# Patient Record
Sex: MALE | Race: BLACK OR AFRICAN AMERICAN | Employment: OTHER | URBAN - METROPOLITAN AREA
[De-identification: names, ages, dates, MRNs, and addresses within clinical notes are randomized per-mention and may not be internally consistent; named-entity substitution may affect disease eponyms.]

---

## 2018-02-12 ENCOUNTER — HOSPITAL ENCOUNTER (EMERGENCY)
Facility: CLINIC | Age: 28
Discharge: HOME | End: 2018-02-12
Attending: EMERGENCY MEDICINE | Admitting: EMERGENCY MEDICINE
Payer: COMMERCIAL

## 2018-02-12 ENCOUNTER — APPOINTMENT (OUTPATIENT)
Dept: ULTRASOUND IMAGING | Facility: CLINIC | Age: 28
End: 2018-02-12
Attending: EMERGENCY MEDICINE
Payer: COMMERCIAL

## 2018-02-12 VITALS
RESPIRATION RATE: 16 BRPM | HEIGHT: 72 IN | OXYGEN SATURATION: 99 % | DIASTOLIC BLOOD PRESSURE: 79 MMHG | WEIGHT: 173.2 LBS | SYSTOLIC BLOOD PRESSURE: 119 MMHG | TEMPERATURE: 98.4 F | BODY MASS INDEX: 23.46 KG/M2

## 2018-02-12 DIAGNOSIS — I82.621 ACUTE DEEP VEIN THROMBOSIS (DVT) OF RIGHT UPPER EXTREMITY, UNSPECIFIED VEIN (H): ICD-10-CM

## 2018-02-12 LAB
ANION GAP SERPL CALCULATED.3IONS-SCNC: 6 MMOL/L (ref 3–14)
BASOPHILS # BLD AUTO: 0 10E9/L (ref 0–0.2)
BASOPHILS NFR BLD AUTO: 0.6 %
BUN SERPL-MCNC: 12 MG/DL (ref 7–30)
CALCIUM SERPL-MCNC: 9.2 MG/DL (ref 8.5–10.1)
CHLORIDE SERPL-SCNC: 106 MMOL/L (ref 94–109)
CK SERPL-CCNC: 247 U/L (ref 30–300)
CO2 SERPL-SCNC: 28 MMOL/L (ref 20–32)
CREAT SERPL-MCNC: 0.86 MG/DL (ref 0.66–1.25)
D DIMER PPP FEU-MCNC: 4 UG/ML FEU (ref 0–0.5)
DIFFERENTIAL METHOD BLD: NORMAL
EOSINOPHIL # BLD AUTO: 0.2 10E9/L (ref 0–0.7)
EOSINOPHIL NFR BLD AUTO: 2.8 %
ERYTHROCYTE [DISTWIDTH] IN BLOOD BY AUTOMATED COUNT: 12.6 % (ref 10–15)
GFR SERPL CREATININE-BSD FRML MDRD: >90 ML/MIN/1.7M2
GLUCOSE SERPL-MCNC: 82 MG/DL (ref 70–99)
HCT VFR BLD AUTO: 48 % (ref 40–53)
HGB BLD-MCNC: 16.5 G/DL (ref 13.3–17.7)
IMM GRANULOCYTES # BLD: 0 10E9/L (ref 0–0.4)
IMM GRANULOCYTES NFR BLD: 0.2 %
LYMPHOCYTES # BLD AUTO: 1.7 10E9/L (ref 0.8–5.3)
LYMPHOCYTES NFR BLD AUTO: 26.7 %
MCH RBC QN AUTO: 29.4 PG (ref 26.5–33)
MCHC RBC AUTO-ENTMCNC: 34.4 G/DL (ref 31.5–36.5)
MCV RBC AUTO: 86 FL (ref 78–100)
MONOCYTES # BLD AUTO: 0.5 10E9/L (ref 0–1.3)
MONOCYTES NFR BLD AUTO: 7.1 %
NEUTROPHILS # BLD AUTO: 4.1 10E9/L (ref 1.6–8.3)
NEUTROPHILS NFR BLD AUTO: 62.6 %
NRBC # BLD AUTO: 0 10*3/UL
NRBC BLD AUTO-RTO: 0 /100
PLATELET # BLD AUTO: 214 10E9/L (ref 150–450)
POTASSIUM SERPL-SCNC: 4 MMOL/L (ref 3.4–5.3)
RBC # BLD AUTO: 5.61 10E12/L (ref 4.4–5.9)
SODIUM SERPL-SCNC: 140 MMOL/L (ref 133–144)
WBC # BLD AUTO: 6.5 10E9/L (ref 4–11)

## 2018-02-12 PROCEDURE — 99284 EMERGENCY DEPT VISIT MOD MDM: CPT | Mod: 25 | Performed by: EMERGENCY MEDICINE

## 2018-02-12 PROCEDURE — 85025 COMPLETE CBC W/AUTO DIFF WBC: CPT | Performed by: EMERGENCY MEDICINE

## 2018-02-12 PROCEDURE — 85379 FIBRIN DEGRADATION QUANT: CPT | Performed by: EMERGENCY MEDICINE

## 2018-02-12 PROCEDURE — 80048 BASIC METABOLIC PNL TOTAL CA: CPT | Performed by: EMERGENCY MEDICINE

## 2018-02-12 PROCEDURE — 82550 ASSAY OF CK (CPK): CPT | Performed by: EMERGENCY MEDICINE

## 2018-02-12 PROCEDURE — 99284 EMERGENCY DEPT VISIT MOD MDM: CPT | Mod: Z6 | Performed by: EMERGENCY MEDICINE

## 2018-02-12 PROCEDURE — 93971 EXTREMITY STUDY: CPT | Mod: RT

## 2018-02-12 ASSESSMENT — ENCOUNTER SYMPTOMS
FEVER: 0
COUGH: 0
ARTHRALGIAS: 0
SHORTNESS OF BREATH: 0
WOUND: 0
CHILLS: 0
APPETITE CHANGE: 0
PALPITATIONS: 0

## 2018-02-12 NOTE — ED AVS SNAPSHOT
[facility], [place], Emergency Department    [address]    [place] [address]    Phone:  [phone]    Fax:  [phone]                                       Adriana Singh   MRN: [id number]    Department:  [facility], Emergency Department   Date of Visit:  2/12/2018           After Visit Summary Signature Page     I have received my discharge instructions, and my questions have been answered. I have discussed any challenges I see with this plan with the nurse or doctor.    ..........................................................................................................................................  Patient/Patient Representative Signature      ..........................................................................................................................................  Patient Representative Print Name and Relationship to Patient    ..................................................               ................................................  Date                                            Time    ..........................................................................................................................................  Reviewed by Signature/Title    ...................................................              ..............................................  Date                                                            Time

## 2018-02-12 NOTE — ED PROVIDER NOTES
History     Chief Complaint   Patient presents with     Arm Pain     Right arm pain and swelling started Thursday night. No injury. Has difficulty holding bottle of water. Tingling in right UE.      HPI  20-year-old male without significant past medical history having today to the emergency room for evaluation of right upper extremity swelling.  The patient reports onset of approximately 4-5 nights ago.  He initially noted some tingling when sleeping with his arm above his head.  He states over the past several days he has noted increasing generalized swelling about the right upper extremity.  He reports no trauma.  He has had no similar symptoms in the past.  At this time he reports no wound or injury.  He denies distal change in sensation or motor ability.  He denies other swelling of alternative limbs.  He reports no systemic symptoms such as chest pain, shortness of breath, fever, chills.  He reports no history of coagulopathy or prior intravenous access to the extra extremity.  He states he is healthy at baseline and reports no ongoing issues.        I have reviewed the Medications, Allergies, Past Medical and Surgical History, and Social History in the Epic system.    Review of Systems   Constitutional: Negative for appetite change, chills and fever.   Respiratory: Negative for cough and shortness of breath.    Cardiovascular: Negative for chest pain, palpitations and leg swelling.   Musculoskeletal: Negative for arthralgias.   Skin: Negative for rash and wound.       Physical Exam   BP: 119/79  Heart Rate: 83  Temp: 98.4  F (36.9  C)  Resp: 16  Height: 182.9 cm (6')  Weight: 78.6 kg (173 lb 3.2 oz)  SpO2: 99 %      Physical Exam   Constitutional: He is oriented to person, place, and time. He appears well-developed and well-nourished. No distress.   Cardiovascular: Normal rate, regular rhythm and normal heart sounds.    Pulmonary/Chest: Effort normal. No respiratory distress. He has no wheezes.   Abdominal:  Soft. He exhibits no distension. There is no tenderness. There is no rebound.   Musculoskeletal: He exhibits no deformity.        Arms:  Neurological: He is alert and oriented to person, place, and time. No cranial nerve deficit.   Skin: Skin is warm. No rash noted. He is not diaphoretic.   Psychiatric: He has a normal mood and affect.       ED Course     ED Course     Procedures       Labs Ordered and Resulted from Time of ED Arrival Up to the Time of Departure from the ED - No data to display         Assessments & Plan (with Medical Decision Making)     20-year-old male without segment past medical history arriving today to the emergency room for evaluation of right upper extremity swelling.  Upon arrival this patient is known to be alert, afebrile, hemodynamically stable.  He appears well at rest.  He is speaking full sentences without evidence of increased work of breathing or cardiovascular compromise.  With lack of symptoms with suspicion at this time for pulmonary embolus would be quite low.  He does have generalized edematous appearance to the right upper extremity with distended superficial veins.  Distal radial pulse and capillary refill within normal limits with suspicion at this time for arterial involvement such as aneurysm or arterial thrombus would be very low.  He has no neurologic deficit.  He has no evidence of external trauma there is no evidence of dislocation or joint involvement such as septic arthritis or effusion.  While his symptoms and risk factors are very low this is most consistent with deep venous thrombosis of the right upper extremity.  Plan at this time will be to obtain laboratory studies to include CBC basic metabolic panel d-dimer and right upper extremity ultrasonography.    Laboratory studies do demonstrate an elevated d-dimer and ultrasonography reveals an occlusive clot.  At this time the patient seems to have no obvious reason for the clot in the seems to be unprovoked in the  right upper extremity which is odd.  Because of this I did place an outpatient follow-up with vascular medicine for evaluation and consideration of coagulopathy workup.  The patient was placed on Xarelto at a risk and benefit discussion on Xarelto versus warfarin and Lovenox therapy.  We discussed indications to call or return emergently such as chest pain, shortness of breath, traumatic injury or other concern.    I have reviewed the nursing notes.    I have reviewed the findings, diagnosis, plan and need for follow up with the patient.    New Prescriptions    No medications on file       Final diagnoses:   Acute deep vein thrombosis (DVT) of right upper extremity, unspecified vein (H)       2/12/2018   [facility], [place], EMERGENCY DEPARTMENT     Zaid Nassar MD  02/12/18 [id number]

## 2018-02-12 NOTE — ED NOTES
Right arm pain and swelling started Thursday night. No injury. Has difficulty holding bottle of water. Denies pain, just Tingling in right UE

## 2018-02-12 NOTE — ED AVS SNAPSHOT
[facility], Emergency Department    [address]    [place] [address]    Phone:  [phone]    Fax:  [phone]                                       Adriana Singh   MRN: [id number]    Department:  [facility], Emergency Department   Date of Visit:  2/12/2018           Patient Information     Date Of Birth          1990        Your diagnoses for this visit were:     Acute deep vein thrombosis (DVT) of right upper extremity, unspecified vein (H)        You were seen by Zaid Nassar MD.      Follow-up Information     Follow up with [facility], Emergency Department.    Specialty:  EMERGENCY MEDICINE    Why:  As needed, If symptoms worsen    Contact information:    [address]  [facility] [place] [address] [phone]    Additional information:    The [facility] is located in the [facility]. lt is easily accessible from virtually any point in the [facility] area, via [address]        Discharge Instructions         Please make an appointment to follow up with [facility] (phone: [phone]) as soon as possible.      Complications of Deep Vein Thrombosis  Deep vein thrombosis (DVT) is a condition involving the formation of a blood clot or thrombus in a deep vein. One may develop in a large vein deep inside the leg, arm, or other part of the body. Complications from deep vein thrombosis can be very serious. They can include pulmonary embolism (PE), chronic venous insufficiency, and post-thrombotic syndrome.   You may hear healthcare providers use the term venous thromboembolism (VTE) to describe DVT and PE. They use the term VTE because the two conditions are very closely related. And, because their prevention and treatment are closely related.         A pulmonary embolism can block a blood vessel in the lungs and must be treated right away.      Pulmonary embolism  Pulmonary embolism (PE) happens when part of the clot, called  an embolus, separates from the vein. It travels to the lungs and cuts off the flow of blood. A PE may develop quickly. It is a medical emergency and may cause death.      When to seek medical advice  Call 911 or emergency help if you have symptoms of a blood clot in the lungs. Symptoms may include:    Chest pain    Trouble breathing or sudden shortness of breath    Coughing (may cough up blood)    Fainting    Fast heartbeat    Sweating  You may have bleeding if you take medicine to help prevent blood clots. Call 911 if you have heavy or uncontrolled bleeding.  When to call your healthcare provider  Call your healthcare provider if you have symptoms of a blood clot. The symptoms include:    Swelling    Pain    Redness in your leg, arm, or other area  Call your healthcare provider if you have signs or symptoms of bleeding, like blood in the urine, bleeding with bowel movements, or bleeding from the nose, gums, a cut, or vagina.   Chronic venous insufficiency and post-thrombotic syndrome  Two other complications of deep vein thrombosis are chronic venous insufficiency and post-thrombotic syndrome.  Chronic venous insufficiency may happen following deep vein thrombosis of a leg vein. It means that a vein no longer works as well. It is a long-term condition where blood stays in the vein instead of flowing back to the heart. Pain and swelling in the leg are common symptoms.  Post-thrombotic syndrome may also happen following deep vein thrombosis of a leg vein. It is a long-term problem with pain, swelling, and redness. Ulcers and sores can also happen if the condition is not treated early. These complications and associated symptoms may make it difficult to walk and take part in daily activities.  Date Last Reviewed: 5/1/2016 2000-2017 [employer]. [address] [place] [address], [place] [address]. All rights reserved. This information is not intended as a substitute for professional medical care. Always follow  your healthcare professional's instructions.          Understanding Deep Vein Thrombosis  Deep vein thrombosis (DVT) is a condition in which a blood clot or thrombus forms in a deep vein. A blood clot is most common in the leg, but can develop in a large vein deep inside the leg, arm, or other part of the body. Part of the clot called an embolus can separate from the vein. It may travel to the lungs and form a pulmonary embolus (PE). This can cut off the flow to a portion of the lung or to the entire lung. A PE is a medical emergency and may cause death. Healthcare providers use the term venous thromboembolism (VTE) to describe the two conditions, DVT and PE. They use the term VTE because the two conditions are very closely related. And, because their prevention and treatment are closely related.  Over time, a blood clot can also permanently damage veins. To protect your health, a blood clot must be treated right away.  How DVT develops  The deep veins of the legs are located in the muscles. These help carry blood clot from the legs to the heart. When leg muscles contract and relax, blood is squeezed through the veins back to the heart. One-way valves inside the veins help keep the blood moving in the right direction. When blood moves too slowly or not at all, it can pool in the veins. This makes a clot more likely to form.    Risk factors  Anyone can develop a blood clot. The following risk factors make a blood clot more likely to happen:    Being inactive for a long period, such as when you re in the hospital, or traveling by plane or car    Injury to a vein from an accident, a broken bone, or surgery    Having blood clots in the past    Personal or family history of a blood-clotting disorder    Recent surgery    Cancer and certain cancer treatments    Smoking  Other factors can also put you at higher risk for a blood clot. They include:    Age over 60 years    Pregnancy    Taking birth control or hormone  replacement    Having other vein problems    Being overweight  Common symptoms  A blood clot does not always cause obvious symptoms. If you do have symptoms, they usually happen suddenly. They may include:    Pain, especially deep in the muscle    Swelling    Aching or tenderness    Red or warm skin    Fever  Call your healthcare provider if you have these symptoms.  Symptoms of pulmonary embolism may include:    Trouble breathing    Fast heartbeat    Chest pain    Sweating    Coughing (may cough up blood)    Fainting  Call 911 if you have these symptoms.   If you take medicine to help prevent blood clots, you have an increased risk of bleeding. Call 911 if you have heavy or uncontrolled bleeding. Call your healthcare provider if you have other signs or symptoms of bleeding like blood in the urine, bleeding with bowel movements, or bleeding from the nose, gums, a cut, or vagina.  Diagnosing DVT  Diagnosis begins with thorough questions about your symptoms and medical history along with a physical exam.  Diagnostic tests include:    An imaging test called a duplex ultrasound. This test uses sound waves to create pictures of veins and blood flow.    Blood tests to check for clotting and other problems.  If your healthcare provider thinks you may have pulmonary embolism, additional testing will be done.  Treating DVT  Treating a blood clot may include:    Medicine to thin the blood and prevent pulmonary embolism and other complications.    Staying in the hospital. This may or may not be necessary.    Surgery for some people, like those who cannot take blood-thinning medicines.  Preventing DVT  Many people who are in the hospital are at an increased risk of developing blood clots. So, preventing blood clots is an important part of in-hospital care. The care may include getting out of bed regularly, taking medicine, or using special therapies or devices. Other factors and conditions may increase the risk of blood clots.  "Review your risk with your healthcare provider.  Date Last Reviewed: 5/1/2016 2000-2017 [employer]. [address] [place] [address], [place] [address]. All rights reserved. This information is not intended as a substitute for professional medical care. Always follow your healthcare professional's instructions.          24 Hour Appointment Hotline       To make an appointment at any [facility], call [phone] ([phone]). If you don't have a family doctor or clinic, we will help you find one. [facility] are conveniently located to serve the needs of you and your family.          ED Discharge Orders     VASCULAR MEDICINE REFERRAL ([facility])       PAD without symptoms - order lipid panel and \"US CLAUDIA Doppler no exercise\" (ABH2284)  PAD with claudication - order \"US CLAUDIA Doppler with exercise\" ([id number]), \"US aorta/ivc/iliac duplex complete\" ([id number]) and \"US lower extremity arterial duplex bilateral\" ([id number])  PAD with critical limb ischemia - resting CLAUDIA and toe-brachial index (refer to vascular medicine for triage of testing)  Varicose veins/edema - \"US lower extremity venous duplex bilateral\" ([id number])  [Number to call to schedule: [phone]. Vascular medicine providers are: Bennett Leslie Godishala]                       Review of your medicines      START taking        Dose / Directions Last dose taken    Rivaroxaban 15 & 20 MG Tbpk   Commonly known as:  XARELTO STARTER PACK   Dose:  1 Package   Quantity:  1 each        Take 1 Package by mouth once for 1 dose   Refills:  0                Prescriptions were sent or printed at these locations (1 Prescription)                   Other Prescriptions                Printed at Department/Unit printer (1 of 1)         Rivaroxaban (XARELTO STARTER PACK) 15 & 20 MG TBPK                Procedures and tests performed during your visit     Arm, right, venous US    Basic metabolic panel    CBC with platelets differential    CK total    D dimer " "quantitative      Orders Needing Specimen Collection     None      Pending Results     Date and Time Order Name Status Description    2018 1607 Arm, right, venous US Preliminary             Pending Culture Results     No orders found from 2/10/2018 to 2018.            Pending Results Instructions     If you had any lab results that were not finalized at the time of your Discharge, you can call the ED Lab Result RN at [phone]. You will be contacted by this team for any positive Lab results or changes in treatment. The nurses are available 7 days a week from 10A to 6:30P.  You can leave a message 24 hours per day and they will return your call.        Thank you for choosing [facility]       Thank you for choosing [facility] for your care. Our goal is always to provide you with excellent care. Hearing back from our patients is one way we can continue to improve our services. Please take a few minutes to complete the written survey that you may receive in the mail after you visit with us. Thank you!        [employer] Information     [employer] lets you send messages to your doctor, view your test results, renew your prescriptions, schedule appointments and more. To sign up, go to [email or web address] . Click on \"Log in\" on the left side of the screen, which will take you to the Welcome page. Then click on \"Sign up Now\" on the right side of the page.     You will be asked to enter the access code listed below, as well as some personal information. Please follow the directions to create your username and password.     Your access code is: [email or web address]  Expires: 2018  6:20 PM     Your access code will  in 90 days. If you need help or a new code, please call your [facility] clinic or [phone].        Care EveryWhere ID     This is your Care EveryWhere ID. This could be used by other organizations to access your [facility] medical records  [id number]        Equal Access to Services     GRACE JACKSON AH: " Hadii flower Barclay, wawillyda lorrie, qaboogieta kaaljasmeet ngo. So [facility] [phone].    ATENCIÓN: Si habla español, tiene a fisher disposición servicios gratuitos de asistencia lingüística. Llame al [phone].    We comply with applicable federal civil rights laws and [place] laws. We do not discriminate on the basis of race, color, national origin, age, disability, sex, sexual orientation, or gender identity.            After Visit Summary       This is your record. Keep this with you and show to your community pharmacist(s) and doctor(s) at your next visit.

## 2018-02-13 NOTE — DISCHARGE INSTRUCTIONS
Please make an appointment to follow up with [facility] (phone: [phone]) as soon as possible.      Complications of Deep Vein Thrombosis  Deep vein thrombosis (DVT) is a condition involving the formation of a blood clot or thrombus in a deep vein. One may develop in a large vein deep inside the leg, arm, or other part of the body. Complications from deep vein thrombosis can be very serious. They can include pulmonary embolism (PE), chronic venous insufficiency, and post-thrombotic syndrome.   You may hear healthcare providers use the term venous thromboembolism (VTE) to describe DVT and PE. They use the term VTE because the two conditions are very closely related. And, because their prevention and treatment are closely related.         A pulmonary embolism can block a blood vessel in the lungs and must be treated right away.      Pulmonary embolism  Pulmonary embolism (PE) happens when part of the clot, called an embolus, separates from the vein. It travels to the lungs and cuts off the flow of blood. A PE may develop quickly. It is a medical emergency and may cause death.      When to seek medical advice  Call 911 or emergency help if you have symptoms of a blood clot in the lungs. Symptoms may include:    Chest pain    Trouble breathing or sudden shortness of breath    Coughing (may cough up blood)    Fainting    Fast heartbeat    Sweating  You may have bleeding if you take medicine to help prevent blood clots. Call 911 if you have heavy or uncontrolled bleeding.  When to call your healthcare provider  Call your healthcare provider if you have symptoms of a blood clot. The symptoms include:    Swelling    Pain    Redness in your leg, arm, or other area  Call your healthcare provider if you have signs or symptoms of bleeding, like blood in the urine, bleeding with bowel movements, or bleeding from the nose, gums, a cut, or vagina.   Chronic venous insufficiency and post-thrombotic  syndrome  Two other complications of deep vein thrombosis are chronic venous insufficiency and post-thrombotic syndrome.  Chronic venous insufficiency may happen following deep vein thrombosis of a leg vein. It means that a vein no longer works as well. It is a long-term condition where blood stays in the vein instead of flowing back to the heart. Pain and swelling in the leg are common symptoms.  Post-thrombotic syndrome may also happen following deep vein thrombosis of a leg vein. It is a long-term problem with pain, swelling, and redness. Ulcers and sores can also happen if the condition is not treated early. These complications and associated symptoms may make it difficult to walk and take part in daily activities.  Date Last Reviewed: 5/1/2016 2000-2017 [employer]. [address] [place] [address], [place] [address]. All rights reserved. This information is not intended as a substitute for professional medical care. Always follow your healthcare professional's instructions.          Understanding Deep Vein Thrombosis  Deep vein thrombosis (DVT) is a condition in which a blood clot or thrombus forms in a deep vein. A blood clot is most common in the leg, but can develop in a large vein deep inside the leg, arm, or other part of the body. Part of the clot called an embolus can separate from the vein. It may travel to the lungs and form a pulmonary embolus (PE). This can cut off the flow to a portion of the lung or to the entire lung. A PE is a medical emergency and may cause death. Healthcare providers use the term venous thromboembolism (VTE) to describe the two conditions, DVT and PE. They use the term VTE because the two conditions are very closely related. And, because their prevention and treatment are closely related.  Over time, a blood clot can also permanently damage veins. To protect your health, a blood clot must be treated right away.  How DVT develops  The deep veins of the legs are located  in the muscles. These help carry blood clot from the legs to the heart. When leg muscles contract and relax, blood is squeezed through the veins back to the heart. One-way valves inside the veins help keep the blood moving in the right direction. When blood moves too slowly or not at all, it can pool in the veins. This makes a clot more likely to form.    Risk factors  Anyone can develop a blood clot. The following risk factors make a blood clot more likely to happen:    Being inactive for a long period, such as when you re in the hospital, or traveling by plane or car    Injury to a vein from an accident, a broken bone, or surgery    Having blood clots in the past    Personal or family history of a blood-clotting disorder    Recent surgery    Cancer and certain cancer treatments    Smoking  Other factors can also put you at higher risk for a blood clot. They include:    Age over 60 years    Pregnancy    Taking birth control or hormone replacement    Having other vein problems    Being overweight  Common symptoms  A blood clot does not always cause obvious symptoms. If you do have symptoms, they usually happen suddenly. They may include:    Pain, especially deep in the muscle    Swelling    Aching or tenderness    Red or warm skin    Fever  Call your healthcare provider if you have these symptoms.  Symptoms of pulmonary embolism may include:    Trouble breathing    Fast heartbeat    Chest pain    Sweating    Coughing (may cough up blood)    Fainting  Call 911 if you have these symptoms.   If you take medicine to help prevent blood clots, you have an increased risk of bleeding. Call 911 if you have heavy or uncontrolled bleeding. Call your healthcare provider if you have other signs or symptoms of bleeding like blood in the urine, bleeding with bowel movements, or bleeding from the nose, gums, a cut, or vagina.  Diagnosing DVT  Diagnosis begins with thorough questions about your symptoms and medical history along  with a physical exam.  Diagnostic tests include:    An imaging test called a duplex ultrasound. This test uses sound waves to create pictures of veins and blood flow.    Blood tests to check for clotting and other problems.  If your healthcare provider thinks you may have pulmonary embolism, additional testing will be done.  Treating DVT  Treating a blood clot may include:    Medicine to thin the blood and prevent pulmonary embolism and other complications.    Staying in the hospital. This may or may not be necessary.    Surgery for some people, like those who cannot take blood-thinning medicines.  Preventing DVT  Many people who are in the hospital are at an increased risk of developing blood clots. So, preventing blood clots is an important part of in-hospital care. The care may include getting out of bed regularly, taking medicine, or using special therapies or devices. Other factors and conditions may increase the risk of blood clots. Review your risk with your healthcare provider.  Date Last Reviewed: 5/1/2016 2000-2017 [employer]. [address] [place] [address], [place] [address]. All rights reserved. This information is not intended as a substitute for professional medical care. Always follow your healthcare professional's instructions.

## 2018-04-26 ENCOUNTER — APPOINTMENT (OUTPATIENT)
Dept: CT IMAGING | Facility: CLINIC | Age: 28
End: 2018-04-26
Attending: INTERNAL MEDICINE
Payer: COMMERCIAL

## 2018-04-26 ENCOUNTER — HOSPITAL ENCOUNTER (EMERGENCY)
Facility: CLINIC | Age: 28
Discharge: HOME | End: 2018-04-26
Attending: INTERNAL MEDICINE | Admitting: INTERNAL MEDICINE
Payer: COMMERCIAL

## 2018-04-26 VITALS
OXYGEN SATURATION: 100 % | RESPIRATION RATE: 18 BRPM | SYSTOLIC BLOOD PRESSURE: 108 MMHG | TEMPERATURE: 98.3 F | WEIGHT: 174.13 LBS | HEART RATE: 67 BPM | DIASTOLIC BLOOD PRESSURE: 76 MMHG | BODY MASS INDEX: 23.62 KG/M2

## 2018-04-26 DIAGNOSIS — Z91.148 NONCOMPLIANCE WITH MEDICATION REGIMEN: ICD-10-CM

## 2018-04-26 DIAGNOSIS — I82.621 ACUTE DEEP VEIN THROMBOSIS (DVT) OF RIGHT UPPER EXTREMITY, UNSPECIFIED VEIN (H): ICD-10-CM

## 2018-04-26 LAB
ALBUMIN SERPL-MCNC: 3.9 G/DL (ref 3.4–5)
ALBUMIN UR-MCNC: NEGATIVE MG/DL
ALP SERPL-CCNC: 67 U/L (ref 40–150)
ALT SERPL W P-5'-P-CCNC: 12 U/L (ref 0–70)
ANION GAP SERPL CALCULATED.3IONS-SCNC: 9 MMOL/L (ref 3–14)
APPEARANCE UR: CLEAR
AST SERPL W P-5'-P-CCNC: 28 U/L (ref 0–45)
BASOPHILS # BLD AUTO: 0 10E9/L (ref 0–0.2)
BASOPHILS NFR BLD AUTO: 0.3 %
BILIRUB SERPL-MCNC: 1.2 MG/DL (ref 0.2–1.3)
BILIRUB UR QL STRIP: NEGATIVE
BUN SERPL-MCNC: 9 MG/DL (ref 7–30)
CALCIUM SERPL-MCNC: 9.2 MG/DL (ref 8.5–10.1)
CHLORIDE SERPL-SCNC: 105 MMOL/L (ref 94–109)
CO2 SERPL-SCNC: 28 MMOL/L (ref 20–32)
COLOR UR AUTO: YELLOW
CREAT SERPL-MCNC: 1.05 MG/DL (ref 0.66–1.25)
D DIMER PPP FEU-MCNC: <0.3 UG/ML FEU (ref 0–0.5)
DIFFERENTIAL METHOD BLD: NORMAL
EOSINOPHIL # BLD AUTO: 0.1 10E9/L (ref 0–0.7)
EOSINOPHIL NFR BLD AUTO: 2.4 %
ERYTHROCYTE [DISTWIDTH] IN BLOOD BY AUTOMATED COUNT: 12.4 % (ref 10–15)
GFR SERPL CREATININE-BSD FRML MDRD: 84 ML/MIN/1.7M2
GLUCOSE SERPL-MCNC: 109 MG/DL (ref 70–99)
GLUCOSE UR STRIP-MCNC: NEGATIVE MG/DL
HCT VFR BLD AUTO: 43.9 % (ref 40–53)
HGB BLD-MCNC: 15.3 G/DL (ref 13.3–17.7)
HGB UR QL STRIP: NEGATIVE
IMM GRANULOCYTES # BLD: 0 10E9/L (ref 0–0.4)
IMM GRANULOCYTES NFR BLD: 0.2 %
INR PPP: 1.11 (ref 0.86–1.14)
KETONES UR STRIP-MCNC: NEGATIVE MG/DL
LEUKOCYTE ESTERASE UR QL STRIP: NEGATIVE
LYMPHOCYTES # BLD AUTO: 1.7 10E9/L (ref 0.8–5.3)
LYMPHOCYTES NFR BLD AUTO: 28.4 %
MCH RBC QN AUTO: 29 PG (ref 26.5–33)
MCHC RBC AUTO-ENTMCNC: 34.9 G/DL (ref 31.5–36.5)
MCV RBC AUTO: 83 FL (ref 78–100)
MONOCYTES # BLD AUTO: 0.3 10E9/L (ref 0–1.3)
MONOCYTES NFR BLD AUTO: 5 %
MUCOUS THREADS #/AREA URNS LPF: PRESENT /LPF
NEUTROPHILS # BLD AUTO: 3.7 10E9/L (ref 1.6–8.3)
NEUTROPHILS NFR BLD AUTO: 63.7 %
NITRATE UR QL: NEGATIVE
NRBC # BLD AUTO: 0 10*3/UL
NRBC BLD AUTO-RTO: 0 /100
NT-PROBNP SERPL-MCNC: 23 PG/ML (ref 0–450)
PH UR STRIP: 6 PH (ref 5–7)
PLATELET # BLD AUTO: 224 10E9/L (ref 150–450)
POTASSIUM SERPL-SCNC: 3.7 MMOL/L (ref 3.4–5.3)
PROT SERPL-MCNC: 7.3 G/DL (ref 6.8–8.8)
RBC # BLD AUTO: 5.28 10E12/L (ref 4.4–5.9)
RBC #/AREA URNS AUTO: 0 /HPF (ref 0–2)
SODIUM SERPL-SCNC: 142 MMOL/L (ref 133–144)
SOURCE: ABNORMAL
SP GR UR STRIP: 1.01 (ref 1–1.03)
TROPONIN I SERPL-MCNC: <0.015 UG/L (ref 0–0.04)
UROBILINOGEN UR STRIP-MCNC: NORMAL MG/DL (ref 0–2)
WBC # BLD AUTO: 5.8 10E9/L (ref 4–11)
WBC #/AREA URNS AUTO: <1 /HPF (ref 0–5)

## 2018-04-26 PROCEDURE — 80053 COMPREHEN METABOLIC PANEL: CPT | Performed by: INTERNAL MEDICINE

## 2018-04-26 PROCEDURE — 25000128 H RX IP 250 OP 636: Performed by: INTERNAL MEDICINE

## 2018-04-26 PROCEDURE — 85379 FIBRIN DEGRADATION QUANT: CPT | Performed by: INTERNAL MEDICINE

## 2018-04-26 PROCEDURE — 93010 ELECTROCARDIOGRAM REPORT: CPT | Mod: Z6 | Performed by: INTERNAL MEDICINE

## 2018-04-26 PROCEDURE — 85025 COMPLETE CBC W/AUTO DIFF WBC: CPT | Performed by: INTERNAL MEDICINE

## 2018-04-26 PROCEDURE — 96360 HYDRATION IV INFUSION INIT: CPT | Mod: 59 | Performed by: INTERNAL MEDICINE

## 2018-04-26 PROCEDURE — 25000125 ZZHC RX 250: Performed by: INTERNAL MEDICINE

## 2018-04-26 PROCEDURE — 85610 PROTHROMBIN TIME: CPT | Performed by: INTERNAL MEDICINE

## 2018-04-26 PROCEDURE — 99284 EMERGENCY DEPT VISIT MOD MDM: CPT | Mod: 25 | Performed by: INTERNAL MEDICINE

## 2018-04-26 PROCEDURE — 81001 URINALYSIS AUTO W/SCOPE: CPT | Performed by: INTERNAL MEDICINE

## 2018-04-26 PROCEDURE — 83880 ASSAY OF NATRIURETIC PEPTIDE: CPT | Performed by: INTERNAL MEDICINE

## 2018-04-26 PROCEDURE — 99285 EMERGENCY DEPT VISIT HI MDM: CPT | Mod: 25 | Performed by: INTERNAL MEDICINE

## 2018-04-26 PROCEDURE — 71260 CT THORAX DX C+: CPT

## 2018-04-26 PROCEDURE — 84484 ASSAY OF TROPONIN QUANT: CPT | Performed by: INTERNAL MEDICINE

## 2018-04-26 PROCEDURE — 93005 ELECTROCARDIOGRAM TRACING: CPT | Performed by: INTERNAL MEDICINE

## 2018-04-26 RX ORDER — IOPAMIDOL 755 MG/ML
100 INJECTION, SOLUTION INTRAVASCULAR ONCE
Status: COMPLETED | OUTPATIENT
Start: 2018-04-26 | End: 2018-04-26

## 2018-04-26 RX ADMIN — SODIUM CHLORIDE 1000 ML: 9 INJECTION, SOLUTION INTRAVENOUS at 13:35

## 2018-04-26 RX ADMIN — IOPAMIDOL 64 ML: 755 INJECTION, SOLUTION INTRAVENOUS at 14:14

## 2018-04-26 RX ADMIN — SODIUM CHLORIDE 84 ML: 9 INJECTION, SOLUTION INTRAVENOUS at 14:14

## 2018-04-26 ASSESSMENT — ENCOUNTER SYMPTOMS
WHEEZING: 0
SHORTNESS OF BREATH: 0

## 2018-04-26 NOTE — ED PROVIDER NOTES
[facility] EMERGENCY DEPARTMENT ([facility])    4/26/18       History     Chief Complaint   Patient presents with     Chest Pain     Right arm is swollen and radiates into right chest.  Pt states he has had a clot in that arm before.  States he feels SOA when going up steps.  Took xerato last time he had this.     HPI  Said Francisco is a 28 year old male with a medical history significant for acute DVT of the axillary vein of right upper extremity who presents to the Emergency Department for evaluation of right upper extremity swelling.  Per review of patient's chart, patient was originally seen and evaluated on 2/12/2018 complaining of right upper extremity swelling.  Patient was found to have a right upper extremity DVT and started on Xarelto.  The patient here states that he has been taking his Xarelto as prescribed and he did initially notice improvement of swelling of the right upper extremity.  The patient returns today as the swelling has began worsening once again.  The patient notes that the swelling involves his entire right upper extremity and his anterior right shoulder.  He notes pain in the same areas as well.  He denies any difficulty breathing, denies any chest pain, denies any leg swelling or leg pain.    I have reviewed the Medications, Allergies, Past Medical and Surgical History, and Social History in the [employer] system.    Past Medical History:   Diagnosis Date     Acute deep vein thrombosis (DVT) of axillary vein of right upper extremity (H)        History reviewed. No pertinent surgical history.    No family history on file.    Social History   Substance Use Topics     Smoking status: Never Smoker     Smokeless tobacco: Never Used     Alcohol use No       No current facility-administered medications for this encounter.      Current Outpatient Prescriptions   Medication     rivaroxaban ANTICOAGULANT (XARELTO) 20 MG TABS tablet     [DISCONTINUED] Rivaroxaban (XARELTO STARTER PACK) 15 & 20 MG  TBPK      No Known Allergies      Review of Systems   Respiratory: Negative for shortness of breath and wheezing.    Cardiovascular: Negative for chest pain and leg swelling.   Musculoskeletal:        Positive for right upper extremity swelling  Positive for right upper extremity pain  Negative for lower extremity pain   All other systems reviewed and are negative.      Physical Exam   BP: 125/78  Pulse: 67  Heart Rate: 68  Temp: 98.3  F (36.8  C)  Resp: 16  Weight: 79 kg (174 lb 2 oz)  SpO2: 100 %      Physical Exam   Constitutional: He is oriented to person, place, and time. No distress.   HENT:   Head: Atraumatic.   Mouth/Throat: Oropharynx is clear and moist. No oropharyngeal exudate.   Eyes: Pupils are equal, round, and reactive to light. No scleral icterus.   Neck: Neck supple. No JVD present.   Cardiovascular: Normal rate, normal heart sounds and intact distal pulses.  Exam reveals no gallop and no friction rub.    No murmur heard.  Pulmonary/Chest: Effort normal and breath sounds normal. No respiratory distress. He has no wheezes. He has no rales. He exhibits no tenderness.   Abdominal: Soft. Bowel sounds are normal. He exhibits no distension and no mass. There is no tenderness. There is no rebound and no guarding.   Musculoskeletal: He exhibits no edema or tenderness.        Right shoulder: He exhibits swelling. He exhibits normal range of motion, no tenderness, no bony tenderness, no effusion, no crepitus, no deformity, no laceration, no pain, no spasm, normal pulse and normal strength.        Arms:  Neurological: He is alert and oriented to person, place, and time. No cranial nerve deficit. Coordination normal.   Skin: Skin is warm. No rash noted. He is not diaphoretic.       ED Course   12:26 PM  The patient was seen and examined by Khalida Pelayo MD in Room ED01.     ED Course     Procedures                EKG Interpretation:      Interpreted by Khalida Pelayo MD  Time reviewed:12:56   Symptoms at time  of EKG: None   Rhythm: Normal sinus   Rate: 70 bpm  Axis: Normal  Ectopy: None  Conduction: Normal  ST Segments/ T Waves: No ST-T wave changes and No acute ischemic changes  Q Waves: None  Comparison to prior: No old EKG available    Clinical Impression: normal EKG    Results for orders placed or performed during the hospital encounter of 04/26/18 (from the past 24 hour(s))   CBC with platelets differential     Status: None    Collection Time: 04/26/18 12:50 PM   Result Value Ref Range    WBC 5.8 4.0 - 11.0 10e9/L    RBC Count 5.28 4.4 - 5.9 10e12/L    Hemoglobin 15.3 13.3 - 17.7 g/dL    Hematocrit 43.9 40.0 - 53.0 %    MCV 83 78 - 100 fl    MCH 29.0 26.5 - 33.0 pg    MCHC 34.9 31.5 - 36.5 g/dL    RDW 12.4 10.0 - 15.0 %    Platelet Count 224 150 - 450 10e9/L    Diff Method Automated Method     % Neutrophils 63.7 %    % Lymphocytes 28.4 %    % Monocytes 5.0 %    % Eosinophils 2.4 %    % Basophils 0.3 %    % Immature Granulocytes 0.2 %    Nucleated RBCs 0 0 /100    Absolute Neutrophil 3.7 1.6 - 8.3 10e9/L    Absolute Lymphocytes 1.7 0.8 - 5.3 10e9/L    Absolute Monocytes 0.3 0.0 - 1.3 10e9/L    Absolute Eosinophils 0.1 0.0 - 0.7 10e9/L    Absolute Basophils 0.0 0.0 - 0.2 10e9/L    Abs Immature Granulocytes 0.0 0 - 0.4 10e9/L    Absolute Nucleated RBC 0.0    D dimer quantitative     Status: None    Collection Time: 04/26/18 12:50 PM   Result Value Ref Range    D Dimer <0.3 0.0 - 0.50 ug/ml FEU   INR     Status: None    Collection Time: 04/26/18 12:50 PM   Result Value Ref Range    INR 1.11 0.86 - 1.14   Comprehensive metabolic panel     Status: Abnormal    Collection Time: 04/26/18 12:50 PM   Result Value Ref Range    Sodium 142 133 - 144 mmol/L    Potassium 3.7 3.4 - 5.3 mmol/L    Chloride 105 94 - 109 mmol/L    Carbon Dioxide 28 20 - 32 mmol/L    Anion Gap 9 3 - 14 mmol/L    Glucose 109 (H) 70 - 99 mg/dL    Urea Nitrogen 9 7 - 30 mg/dL    Creatinine 1.05 0.66 - 1.25 mg/dL    GFR Estimate 84 >60 mL/min/1.7m2    GFR  Estimate If Black >90 >60 mL/min/1.7m2    Calcium 9.2 8.5 - 10.1 mg/dL    Bilirubin Total 1.2 0.2 - 1.3 mg/dL    Albumin 3.9 3.4 - 5.0 g/dL    Protein Total 7.3 6.8 - 8.8 g/dL    Alkaline Phosphatase 67 40 - 150 U/L    ALT 12 0 - 70 U/L    AST 28 0 - 45 U/L   Troponin I     Status: None    Collection Time: 04/26/18 12:50 PM   Result Value Ref Range    Troponin I ES <0.015 0.000 - 0.045 ug/L   Nt probnp inpatient (BNP)     Status: None    Collection Time: 04/26/18 12:50 PM   Result Value Ref Range    N-Terminal Pro BNP Inpatient 23 0 - 450 pg/mL   EKG 12-lead, tracing only     Status: None (Preliminary result)    Collection Time: 04/26/18 12:56 PM   Result Value Ref Range    Interpretation ECG Click View Image link to view waveform and result    UA with Microscopic     Status: Abnormal    Collection Time: 04/26/18  1:36 PM   Result Value Ref Range    Color Urine Yellow     Appearance Urine Clear     Glucose Urine Negative NEG^Negative mg/dL    Bilirubin Urine Negative NEG^Negative    Ketones Urine Negative NEG^Negative mg/dL    Specific Gravity Urine 1.009 1.003 - 1.035    Blood Urine Negative NEG^Negative    pH Urine 6.0 5.0 - 7.0 pH    Protein Albumin Urine Negative NEG^Negative mg/dL    Urobilinogen mg/dL Normal 0.0 - 2.0 mg/dL    Nitrite Urine Negative NEG^Negative    Leukocyte Esterase Urine Negative NEG^Negative    Source Midstream Urine     WBC Urine <1 0 - 5 /HPF    RBC Urine 0 0 - 2 /HPF    Mucous Urine Present (A) NEG^Negative /LPF   Chest CT, IV contrast only - PE protocol     Status: None    Collection Time: 04/26/18  2:15 PM    Narrative    CT CHEST PULMONARY EMBOLISM WITH CONTRAST April 26, 2018 2:15 PM     HISTORY: Right chest pain, right arm deep vein thrombosis. Rule out  pulmonary embolus.     FINDINGS: There is a good contrast bolus within the pulmonary  arteries. No pulmonary arterial filling defects are demonstrated to  definitively indicate pulmonary embolism. Contrast enhancement within  the  aorta is less optimal. Nonetheless, there is no evidence of aortic  dissection. The mediastinum and dionisio appear within normal limits.  Spiculated 0.7 cm nodule is noted at the left apex. Adjacent more  ill-defined irregular nodularity is also noted. 0.1 cm nodule is noted  posteromedially in the right lower lobe on image 66. No pleural  effusion or pneumothorax.      Impression    IMPRESSION:  1. No CT evidence of pulmonary embolism.  2. Left apical 0.7 cm spiculated nodule. Although nonspecific,  neoplastic disease cannot be excluded. Three-to six month follow-up is  recommended to ensure the stability of this lesion.  3. Additional 0.1 cm nodule in the right lower lobe on image 66.    ASAD DEL CID MD           Labs Ordered and Resulted from Time of ED Arrival Up to the Time of Departure from the ED   COMPREHENSIVE METABOLIC PANEL - Abnormal; Notable for the following:        Result Value    Glucose 109 (*)     All other components within normal limits   ROUTINE UA WITH MICROSCOPIC - Abnormal; Notable for the following:     Mucous Urine Present (*)     All other components within normal limits   CBC WITH PLATELETS DIFFERENTIAL   D DIMER QUANTITATIVE   INR   TROPONIN I   NT PROBNP INPATIENT   CARDIAC CONTINUOUS MONITORING            Assessments & Plan (with Medical Decision Making)  Right arm DVT since 2/2018 run out of xarelto, persistent swelling, most consistent with postphlibitic syndrome, CT chest neg for PE, D Dimer and other labs neg, EKG and trop neg, D/W Hem fellow-hold off on hypercoagulable work up, refill xarelto for 3 weeks and follow up with Hem in 1-2 weeks.       I have reviewed the nursing notes.    I have reviewed the findings, diagnosis, plan and need for follow up with the patient.    Discharge Medication List as of 4/26/2018  4:33 PM      START taking these medications    Details   rivaroxaban ANTICOAGULANT (XARELTO) 20 MG TABS tablet Take 1 tablet (20 mg) by mouth daily (with dinner), Disp-21  tablet, R-0, Local Print             Final diagnoses:   Acute deep vein thrombosis (DVT) of right upper extremity, unspecified vein (H)   Noncompliance with medication regimen     IMarc, am serving as a trained medical scribe to document services personally performed by Khalida Pelayo MD, based on the provider's statements to me.   IKhalida MD, was physically present and have reviewed and verified the accuracy of this note documented by Marc Holt.    4/26/2018   [facility], [place], EMERGENCY DEPARTMENT     Khalida Pelayo MD  04/26/18 [id number]

## 2018-04-26 NOTE — DISCHARGE INSTRUCTIONS
Complications of Deep Vein Thrombosis  Deep vein thrombosis (DVT) is a condition involving the formation of a blood clot or thrombus in a deep vein. One may develop in a large vein deep inside the leg, arm, or other part of the body. Complications from deep vein thrombosis can be very serious. They can include pulmonary embolism (PE), chronic venous insufficiency, and post-thrombotic syndrome.   You may hear healthcare providers use the term venous thromboembolism (VTE) to describe DVT and PE. They use the term VTE because the two conditions are very closely related. And, because their prevention and treatment are closely related.         A pulmonary embolism can block a blood vessel in the lungs and must be treated right away.      Pulmonary embolism  Pulmonary embolism (PE) happens when part of the clot, called an embolus, separates from the vein. It travels to the lungs and cuts off the flow of blood. A PE may develop quickly. It is a medical emergency and may cause death.      When to seek medical advice  Call 911 or emergency help if you have symptoms of a blood clot in the lungs. Symptoms may include:    Chest pain    Trouble breathing or sudden shortness of breath    Coughing (may cough up blood)    Fainting    Fast heartbeat    Sweating  You may have bleeding if you take medicine to help prevent blood clots. Call 911 if you have heavy or uncontrolled bleeding.  When to call your healthcare provider  Call your healthcare provider if you have symptoms of a blood clot. The symptoms include:    Swelling    Pain    Redness in your leg, arm, or other area  Call your healthcare provider if you have signs or symptoms of bleeding, like blood in the urine, bleeding with bowel movements, or bleeding from the nose, gums, a cut, or vagina.   Chronic venous insufficiency and post-thrombotic syndrome  Two other complications of deep vein thrombosis are chronic venous insufficiency and post-thrombotic syndrome.  Chronic  venous insufficiency may happen following deep vein thrombosis of a leg vein. It means that a vein no longer works as well. It is a long-term condition where blood stays in the vein instead of flowing back to the heart. Pain and swelling in the leg are common symptoms.  Post-thrombotic syndrome may also happen following deep vein thrombosis of a leg vein. It is a long-term problem with pain, swelling, and redness. Ulcers and sores can also happen if the condition is not treated early. These complications and associated symptoms may make it difficult to walk and take part in daily activities.  Date Last Reviewed: 5/1/2016 2000-2017 [employer]. [address] [place] [address]. All rights reserved. This information is not intended as a substitute for professional medical care. Always follow your healthcare professional's instructions.      Please make an appointment to follow up with [facility] (phone: [phone]) in 7-14 days even if entirely better.

## 2018-04-26 NOTE — ED AVS SNAPSHOT
[facility], Emergency Department    [address]    [place] [address]    Phone:  [phone]    Fax:  [phone]                                       Adriana Singh   MRN: [id number]    Department:  [facility], Emergency Department   Date of Visit:  4/26/2018           Patient Information     Date Of Birth          1990        Your diagnoses for this visit were:     Acute deep vein thrombosis (DVT) of right upper extremity, unspecified vein (H)     Noncompliance with medication regimen        You were seen by Khalida Pelayo MD.        Discharge Instructions         Complications of Deep Vein Thrombosis  Deep vein thrombosis (DVT) is a condition involving the formation of a blood clot or thrombus in a deep vein. One may develop in a large vein deep inside the leg, arm, or other part of the body. Complications from deep vein thrombosis can be very serious. They can include pulmonary embolism (PE), chronic venous insufficiency, and post-thrombotic syndrome.   You may hear healthcare providers use the term venous thromboembolism (VTE) to describe DVT and PE. They use the term VTE because the two conditions are very closely related. And, because their prevention and treatment are closely related.         A pulmonary embolism can block a blood vessel in the lungs and must be treated right away.      Pulmonary embolism  Pulmonary embolism (PE) happens when part of the clot, called an embolus, separates from the vein. It travels to the lungs and cuts off the flow of blood. A PE may develop quickly. It is a medical emergency and may cause death.      When to seek medical advice  Call 911 or emergency help if you have symptoms of a blood clot in the lungs. Symptoms may include:    Chest pain    Trouble breathing or sudden shortness of breath    Coughing (may cough up blood)    Fainting    Fast heartbeat    Sweating  You may have bleeding if you take medicine to help prevent blood clots. Call 911 if you  have heavy or uncontrolled bleeding.  When to call your healthcare provider  Call your healthcare provider if you have symptoms of a blood clot. The symptoms include:    Swelling    Pain    Redness in your leg, arm, or other area  Call your healthcare provider if you have signs or symptoms of bleeding, like blood in the urine, bleeding with bowel movements, or bleeding from the nose, gums, a cut, or vagina.   Chronic venous insufficiency and post-thrombotic syndrome  Two other complications of deep vein thrombosis are chronic venous insufficiency and post-thrombotic syndrome.  Chronic venous insufficiency may happen following deep vein thrombosis of a leg vein. It means that a vein no longer works as well. It is a long-term condition where blood stays in the vein instead of flowing back to the heart. Pain and swelling in the leg are common symptoms.  Post-thrombotic syndrome may also happen following deep vein thrombosis of a leg vein. It is a long-term problem with pain, swelling, and redness. Ulcers and sores can also happen if the condition is not treated early. These complications and associated symptoms may make it difficult to walk and take part in daily activities.  Date Last Reviewed: 5/1/2016 2000-2017 [employer]. [address] [place] [address]. All rights reserved. This information is not intended as a substitute for professional medical care. Always follow your healthcare professional's instructions.      Please make an appointment to follow up with [facility] (phone: [phone]) in 7-14 days even if entirely better.     24 Hour Appointment Hotline       To make an appointment at any [facility], call [phone] ([phone]). If you don't have a family doctor or clinic, we will help you find one. [facility] are conveniently located to serve the needs of you and your family.             Review of your medicines      START taking        Dose  / Directions Last dose taken    rivaroxaban ANTICOAGULANT 20 MG Tabs tablet   Commonly known as:  XARELTO   Dose:  20 mg   Quantity:  21 tablet        Take 1 tablet (20 mg) by mouth daily (with dinner)   Refills:  0                Prescriptions were sent or printed at these locations (1 Prescription)                   Other Prescriptions                Printed at Department/Unit printer (1 of 1)         rivaroxaban ANTICOAGULANT (XARELTO) 20 MG TABS tablet                Procedures and tests performed during your visit     CBC with platelets differential    Cardiac Continuous Monitoring    Chest CT, IV contrast only - PE protocol    Comprehensive metabolic panel    D dimer quantitative    EKG 12-lead, tracing only    INR    Nt probnp inpatient (BNP)    Troponin I    UA with Microscopic      Orders Needing Specimen Collection     None      Pending Results     Date and Time Order Name Status Description    4/26/2018 1230 EKG 12-lead, tracing only Preliminary             Pending Culture Results     No orders found from 4/24/2018 to 4/27/2018.            Pending Results Instructions     If you had any lab results that were not finalized at the time of your Discharge, you can call the ED Lab Result RN at [phone]. You will be contacted by this team for any positive Lab results or changes in treatment. The nurses are available 7 days a week from 10A to 6:30P.  You can leave a message 24 hours per day and they will return your call.        Thank you for choosing [facility]       Thank you for choosing [facility] for your care. Our goal is always to provide you with excellent care. Hearing back from our patients is one way we can continue to improve our services. Please take a few minutes to complete the written survey that you may receive in the mail after you visit with us. Thank you!        [facility] [employer] Information     [employer] lets you send messages to your doctor, view your test results, renew your prescriptions, schedule  "appointments and more. To sign up, go to [email or web address] . Click on \"Log in\" on the left side of the screen, which will take you to the Welcome page. Then click on \"Sign up Now\" on the right side of the page.     You will be asked to enter the access code listed below, as well as some personal information. Please follow the directions to create your username and password.     Your access code is: [email or web address]  Expires: 2018  7:20 PM     Your access code will  in 90 days. If you need help or a new code, please call your [facility] clinic or [phone].        Care EveryWhere ID     This is your Care EveryWhere ID. This could be used by other organizations to access your [facility] medical records  [id number]        Equal Access to Services     GRACE JACKSON : Refugio Barclay, sita andrew, vipin ahn, jasmeet khan. So [facility] [phone].    ATENCIÓN: Si habla español, tiene a fisher disposición servicios gratuitos de asistencia lingüística. Llame al [phone].    We comply with applicable federal civil rights laws and [place] laws. We do not discriminate on the basis of race, color, national origin, age, disability, sex, sexual orientation, or gender identity.            After Visit Summary       This is your record. Keep this with you and show to your community pharmacist(s) and doctor(s) at your next visit.                  "

## 2018-04-26 NOTE — ED AVS SNAPSHOT
[facility], [place], Emergency Department    [address]    [place] [address]    Phone:  [phone]    Fax:  [phone]                                       Adriana Singh   MRN: [id number]    Department:  [facility], Emergency Department   Date of Visit:  4/26/2018           After Visit Summary Signature Page     I have received my discharge instructions, and my questions have been answered. I have discussed any challenges I see with this plan with the nurse or doctor.    ..........................................................................................................................................  Patient/Patient Representative Signature      ..........................................................................................................................................  Patient Representative Print Name and Relationship to Patient    ..................................................               ................................................  Date                                            Time    ..........................................................................................................................................  Reviewed by Signature/Title    ...................................................              ..............................................  Date                                                            Time

## 2018-04-27 LAB — INTERPRETATION ECG - MUSE: NORMAL

## 2018-05-31 ENCOUNTER — DOCUMENTATION ONLY (OUTPATIENT)
Dept: HEMATOLOGY | Facility: CLINIC | Age: 28
End: 2018-05-31

## 2018-05-31 ENCOUNTER — TELEPHONE (OUTPATIENT)
Dept: OTHER | Facility: CLINIC | Age: 28
End: 2018-05-31

## 2018-05-31 NOTE — TELEPHONE ENCOUNTER
Mariam [phone] from center for bleeding and clotting at [facility], called back noted pt is a new pt and has not been seen before, was a new pt on Dr. Sauceda, hematolgist schedule, Dr. Sauceda thought pt should see vascular (Dr. Atkinson) first and then he could see Dr. Sauceda if needs hemotologist. Pt referred due to   Hx of recurrent arm clots, to rule out thoracic outlet syndrome based on ED results 4/2018 in EPIC.     Mary Alice Katz, STEVENN, RN

## 2018-05-31 NOTE — TELEPHONE ENCOUNTER
I called RUDY Becerra requesting further information on pt referral.     Mary Alice Katz, STEVENN, RN    used

## 2018-05-31 NOTE — TELEPHONE ENCOUNTER
Left message on home number for patient to call back to schedule consult appointment with Dr. Atkinson.

## 2018-05-31 NOTE — TELEPHONE ENCOUNTER
"Said called, said that he recently saw Dr Sauceda at [facility]. She referred him to see Dr Atkinson for the blood clot in his arm.  I don't see a referral out there for us, there are some ED visits for this in chart. Said states that we can call [phone] a \"Dr Stephens\" office for info, nurse named Mariam Edwards at [phone].    Routing to [facility] Triage RN to determine if this is new referral for for Dr Atkinson or Vascular Medicine.  Said can be reached on his cell at [phone]. Arlene Manzanares -  at [facility]  "

## 2018-05-31 NOTE — TELEPHONE ENCOUNTER
Pt needs to be scheduled for consult with vascular surgery (referring provider requesting Dr. Atkinson), to r/o TOS.     Routing to  to coordinate.     STEVEN RangelN, RN

## 2018-05-31 NOTE — PROGRESS NOTES
Said Francisco has a history of recurrent upper extremity clot.  He was scheduled to see Dr. Sauceda in our center, but upon review of his history, she felt it would be best that he start by seeing a vascular surgeon regarding possible thoracic outlet. Patient agreed and was given the phone number of the [facility].  He has our contact information if he would like to schedule with our team after his visit with the vascular surgeons. Mariam Monzon, RN - Nurse Clinician - [facility] - [phone]

## 2018-11-26 ENCOUNTER — APPOINTMENT (OUTPATIENT)
Dept: CT IMAGING | Facility: CLINIC | Age: 28
End: 2018-11-26
Attending: EMERGENCY MEDICINE
Payer: COMMERCIAL

## 2018-11-26 ENCOUNTER — HOSPITAL ENCOUNTER (EMERGENCY)
Facility: CLINIC | Age: 28
Discharge: HOME | End: 2018-11-27
Attending: EMERGENCY MEDICINE | Admitting: EMERGENCY MEDICINE
Payer: COMMERCIAL

## 2018-11-26 DIAGNOSIS — R10.9 RIGHT FLANK PAIN: ICD-10-CM

## 2018-11-26 LAB
ALBUMIN SERPL-MCNC: 4 G/DL (ref 3.4–5)
ALBUMIN UR-MCNC: NEGATIVE MG/DL
ALP SERPL-CCNC: 60 U/L (ref 40–150)
ALT SERPL W P-5'-P-CCNC: 17 U/L (ref 0–70)
ANION GAP SERPL CALCULATED.3IONS-SCNC: 8 MMOL/L (ref 3–14)
APPEARANCE UR: CLEAR
AST SERPL W P-5'-P-CCNC: 19 U/L (ref 0–45)
BASOPHILS # BLD AUTO: 0 10E9/L (ref 0–0.2)
BASOPHILS NFR BLD AUTO: 0.2 %
BILIRUB SERPL-MCNC: 0.4 MG/DL (ref 0.2–1.3)
BILIRUB UR QL STRIP: NEGATIVE
BUN SERPL-MCNC: 15 MG/DL (ref 7–30)
CALCIUM SERPL-MCNC: 8.9 MG/DL (ref 8.5–10.1)
CHLORIDE SERPL-SCNC: 106 MMOL/L (ref 94–109)
CO2 SERPL-SCNC: 27 MMOL/L (ref 20–32)
COLOR UR AUTO: ABNORMAL
CREAT SERPL-MCNC: 0.99 MG/DL (ref 0.66–1.25)
DIFFERENTIAL METHOD BLD: NORMAL
EOSINOPHIL # BLD AUTO: 0.2 10E9/L (ref 0–0.7)
EOSINOPHIL NFR BLD AUTO: 2.4 %
ERYTHROCYTE [DISTWIDTH] IN BLOOD BY AUTOMATED COUNT: 12.3 % (ref 10–15)
GFR SERPL CREATININE-BSD FRML MDRD: 90 ML/MIN/1.7M2
GLUCOSE SERPL-MCNC: 88 MG/DL (ref 70–99)
GLUCOSE UR STRIP-MCNC: NEGATIVE MG/DL
HCT VFR BLD AUTO: 43.3 % (ref 40–53)
HGB BLD-MCNC: 15.3 G/DL (ref 13.3–17.7)
HGB UR QL STRIP: ABNORMAL
IMM GRANULOCYTES # BLD: 0 10E9/L (ref 0–0.4)
IMM GRANULOCYTES NFR BLD: 0.2 %
KETONES UR STRIP-MCNC: NEGATIVE MG/DL
LEUKOCYTE ESTERASE UR QL STRIP: NEGATIVE
LIPASE SERPL-CCNC: 177 U/L (ref 73–393)
LYMPHOCYTES # BLD AUTO: 1.9 10E9/L (ref 0.8–5.3)
LYMPHOCYTES NFR BLD AUTO: 22.5 %
MCH RBC QN AUTO: 30.1 PG (ref 26.5–33)
MCHC RBC AUTO-ENTMCNC: 35.3 G/DL (ref 31.5–36.5)
MCV RBC AUTO: 85 FL (ref 78–100)
MONOCYTES # BLD AUTO: 0.5 10E9/L (ref 0–1.3)
MONOCYTES NFR BLD AUTO: 5.5 %
NEUTROPHILS # BLD AUTO: 5.7 10E9/L (ref 1.6–8.3)
NEUTROPHILS NFR BLD AUTO: 69.2 %
NITRATE UR QL: NEGATIVE
NRBC # BLD AUTO: 0 10*3/UL
NRBC BLD AUTO-RTO: 0 /100
PH UR STRIP: 6 PH (ref 5–7)
PLATELET # BLD AUTO: 239 10E9/L (ref 150–450)
POTASSIUM SERPL-SCNC: 3.7 MMOL/L (ref 3.4–5.3)
PROT SERPL-MCNC: 7.3 G/DL (ref 6.8–8.8)
RBC # BLD AUTO: 5.08 10E12/L (ref 4.4–5.9)
RBC #/AREA URNS AUTO: <1 /HPF (ref 0–2)
SODIUM SERPL-SCNC: 141 MMOL/L (ref 133–144)
SOURCE: ABNORMAL
SP GR UR STRIP: 1 (ref 1–1.03)
UROBILINOGEN UR STRIP-MCNC: NORMAL MG/DL (ref 0–2)
WBC # BLD AUTO: 8.3 10E9/L (ref 4–11)
WBC #/AREA URNS AUTO: <1 /HPF (ref 0–5)

## 2018-11-26 PROCEDURE — 96374 THER/PROPH/DIAG INJ IV PUSH: CPT

## 2018-11-26 PROCEDURE — 99285 EMERGENCY DEPT VISIT HI MDM: CPT | Mod: 25

## 2018-11-26 PROCEDURE — 96375 TX/PRO/DX INJ NEW DRUG ADDON: CPT

## 2018-11-26 PROCEDURE — 74176 CT ABD & PELVIS W/O CONTRAST: CPT

## 2018-11-26 PROCEDURE — 85379 FIBRIN DEGRADATION QUANT: CPT | Performed by: EMERGENCY MEDICINE

## 2018-11-26 PROCEDURE — 81001 URINALYSIS AUTO W/SCOPE: CPT | Performed by: EMERGENCY MEDICINE

## 2018-11-26 PROCEDURE — 99285 EMERGENCY DEPT VISIT HI MDM: CPT | Mod: Z6 | Performed by: EMERGENCY MEDICINE

## 2018-11-26 PROCEDURE — 85025 COMPLETE CBC W/AUTO DIFF WBC: CPT | Performed by: EMERGENCY MEDICINE

## 2018-11-26 PROCEDURE — 96361 HYDRATE IV INFUSION ADD-ON: CPT | Mod: 59

## 2018-11-26 PROCEDURE — 25000128 H RX IP 250 OP 636: Performed by: EMERGENCY MEDICINE

## 2018-11-26 PROCEDURE — 80053 COMPREHEN METABOLIC PANEL: CPT | Performed by: EMERGENCY MEDICINE

## 2018-11-26 PROCEDURE — 83690 ASSAY OF LIPASE: CPT | Performed by: EMERGENCY MEDICINE

## 2018-11-26 RX ORDER — MORPHINE SULFATE 4 MG/ML
4 INJECTION, SOLUTION INTRAMUSCULAR; INTRAVENOUS ONCE
Status: COMPLETED | OUTPATIENT
Start: 2018-11-26 | End: 2018-11-26

## 2018-11-26 RX ORDER — KETOROLAC TROMETHAMINE 30 MG/ML
30 INJECTION, SOLUTION INTRAMUSCULAR; INTRAVENOUS ONCE
Status: COMPLETED | OUTPATIENT
Start: 2018-11-26 | End: 2018-11-26

## 2018-11-26 RX ADMIN — SODIUM CHLORIDE 1000 ML: 9 INJECTION, SOLUTION INTRAVENOUS at 22:42

## 2018-11-26 RX ADMIN — KETOROLAC TROMETHAMINE 30 MG: 30 INJECTION, SOLUTION INTRAMUSCULAR at 22:42

## 2018-11-26 RX ADMIN — MORPHINE SULFATE 4 MG: 4 INJECTION, SOLUTION INTRAMUSCULAR; INTRAVENOUS at 22:42

## 2018-11-26 ASSESSMENT — ENCOUNTER SYMPTOMS
ABDOMINAL PAIN: 0
ARTHRALGIAS: 0
FLANK PAIN: 1
FEVER: 0
CONFUSION: 0
SHORTNESS OF BREATH: 0
HEADACHES: 0
NECK STIFFNESS: 0
COLOR CHANGE: 0
DIFFICULTY URINATING: 0
EYE REDNESS: 0

## 2018-11-26 NOTE — ED AVS SNAPSHOT
[facility], Emergency Department    [address]    [place] [address]    Phone:  [phone]    Fax:  [phone]                                       Adriana Singh   MRN: [id number]    Department:  [facility], Emergency Department   Date of Visit:  11/26/2018           Patient Information     Date Of Birth          1990        Your diagnoses for this visit were:     Right flank pain        You were seen by Mason Sims MD.        Discharge Instructions       Take ibuprofen 600 mg every 6 hours with food as needed for pain.      Please make an appointment to follow up with Your Primary Care Provider in 1 week as needed.     Discharge References/Attachments     FLANK PAIN, UNCERTAIN CAUSE (ENGLISH)      24 Hour Appointment Hotline       To make an appointment at any [facility] clinic, call [phone] ([phone]). If you don't have a family doctor or clinic, we will help you find one. [facility] clinics are conveniently located to serve the needs of you and your family.             Review of your medicines      Our records show that you are taking the medicines listed below. If these are incorrect, please call your family doctor or clinic.        Dose / Directions Last dose taken    IBUPROFEN PO        Refills:  0        rivaroxaban ANTICOAGULANT 20 MG Tabs tablet   Commonly known as:  XARELTO   Dose:  20 mg   Quantity:  21 tablet        Take 1 tablet (20 mg) by mouth daily (with dinner)   Refills:  0                Procedures and tests performed during your visit     Abd/pelvis CT no contrast - Stone Protocol    CBC with platelets differential    Comprehensive metabolic panel    D dimer quantitative    Lipase    UA with Microscopic reflex to Culture      Orders Needing Specimen Collection     None      Pending Results     No orders found for last 3 day(s).            Pending Culture Results     No orders found for last 3 day(s).            Pending Results Instructions     If you had any lab  "results that were not finalized at the time of your Discharge, you can call the ED Lab Result RN at [phone]. You will be contacted by this team for any positive Lab results or changes in treatment. The nurses are available 7 days a week from 10A to 6:30P.  You can leave a message 24 hours per day and they will return your call.        Thank you for choosing [facility]       Thank you for choosing [facility] for your care. Our goal is always to provide you with excellent care. Hearing back from our patients is one way we can continue to improve our services. Please take a few minutes to complete the written survey that you may receive in the mail after you visit with us. Thank you!        [employer] Information     [employer] lets you send messages to your doctor, view your test results, renew your prescriptions, schedule appointments and more. To sign up, go to [email or web address] . Click on \"Log in\" on the left side of the screen, which will take you to the Welcome page. Then click on \"Sign up Now\" on the right side of the page.     You will be asked to enter the access code listed below, as well as some personal information. Please follow the directions to create your username and password.     Your access code is: [email or web address]  Expires: 2019 12:56 AM     Your access code will  in 90 days. If you need help or a new code, please call your [facility] clinic or [phone].        Care EveryWhere ID     This is your Care EveryWhere ID. This could be used by other organizations to access your [facility] medical records  [id number]        Equal Access to Services     [facility] : Hadii flower Barclay, waaxda luqadaha, qaybta kaaljasmeet ngo . So [facility] [phone].    ATENCIÓN: Si habla español, tiene a fisher disposición servicios gratuitos de asistencia lingüística. Llame al [phone].    We comply with applicable federal civil rights laws and [place] " laws. We do not discriminate on the basis of race, color, national origin, age, disability, sex, sexual orientation, or gender identity.            After Visit Summary       This is your record. Keep this with you and show to your community pharmacist(s) and doctor(s) at your next visit.

## 2018-11-26 NOTE — ED AVS SNAPSHOT
[facility], [place], Emergency Department    [address]    [place] [address]    Phone:  [phone]    Fax:  [phone]                                       Adriana Singh   MRN: [id number]    Department:  [facility], Emergency Department   Date of Visit:  11/26/2018           After Visit Summary Signature Page     I have received my discharge instructions, and my questions have been answered. I have discussed any challenges I see with this plan with the nurse or doctor.    ..........................................................................................................................................  Patient/Patient Representative Signature      ..........................................................................................................................................  Patient Representative Print Name and Relationship to Patient    ..................................................               ................................................  Date                                   Time    ..........................................................................................................................................  Reviewed by Signature/Title    ...................................................              ..............................................  Date                                               Time          22EPIC Rev 08/18

## 2018-11-27 VITALS
WEIGHT: 180.7 LBS | RESPIRATION RATE: 16 BRPM | BODY MASS INDEX: 24.51 KG/M2 | TEMPERATURE: 98.4 F | DIASTOLIC BLOOD PRESSURE: 66 MMHG | SYSTOLIC BLOOD PRESSURE: 130 MMHG | OXYGEN SATURATION: 99 % | HEART RATE: 70 BPM

## 2018-11-27 LAB — D DIMER PPP FEU-MCNC: <0.3 UG/ML FEU (ref 0–0.5)

## 2018-11-27 NOTE — ED PROVIDER NOTES
History     Chief Complaint   Patient presents with     Flank Pain     right flank pain onset today at 11 am; drank water, felt better; went home, started to hurt again at 1800; pain in lower right abdomen with urination; denies blood in urine; difficulty finding comfortable position; denies fever or chills     CORNEL Singh is a 28 year old male who resents to the emergency department with right flank pain.  The patient states that he developed right flank pain at 11:00 this morning.  Is been a constant pain.  He denies any radiation.  Denies any associated nausea and vomiting.  Patient states that after arrival here in emergency department, he did notice some hematuria.  He denies any dysuria, urgency, or frequency.  He denies a history of kidney stones in the past.  Patient was treated earlier this year for DVT with Xarelto.  He states he has not been on that medication for several months.  She denies any chest pain or dyspnea.  No cough.  Patient denies any testicular or groin pain.    I have reviewed the Medications, Allergies, Past Medical and Surgical History, and Social History in the Epic system.    Review of Systems   Constitutional: Negative for fever.   HENT: Negative for congestion.    Eyes: Negative for redness.   Respiratory: Negative for shortness of breath.    Cardiovascular: Negative for chest pain.   Gastrointestinal: Negative for abdominal pain.   Genitourinary: Positive for flank pain. Negative for difficulty urinating.   Musculoskeletal: Negative for arthralgias and neck stiffness.   Skin: Negative for color change.   Neurological: Negative for headaches.   Psychiatric/Behavioral: Negative for confusion.   All other systems reviewed and are negative.      Physical Exam   BP: (!) 137/92  Pulse: 65  Temp: 96.6  F (35.9  C)  Resp: 20  Weight: 82 kg (180 lb 11.2 oz)  SpO2: 100 %      Physical Exam   Constitutional: He appears well-developed and well-nourished. He appears distressed.   HENT:    Head: Normocephalic and atraumatic.   Eyes: Pupils are equal, round, and reactive to light. No scleral icterus.   Cardiovascular: Normal rate, regular rhythm, normal heart sounds and intact distal pulses.    Pulmonary/Chest: Effort normal and breath sounds normal. No respiratory distress.   Abdominal: Soft. Bowel sounds are normal. There is no tenderness.   Musculoskeletal: Normal range of motion. He exhibits no edema or tenderness.   Neurological: He is alert. He has normal strength. Coordination normal.   Skin: Skin is warm and dry. No rash noted. He is not diaphoretic.   Nursing note and vitals reviewed.      ED Course     ED Course     Procedures            Critical Care time:    Results for orders placed or performed during the hospital encounter of 11/26/18 (from the past 24 hour(s))   UA with Microscopic reflex to Culture   Result Value Ref Range    Color Urine Straw     Appearance Urine Clear     Glucose Urine Negative NEG^Negative mg/dL    Bilirubin Urine Negative NEG^Negative    Ketones Urine Negative NEG^Negative mg/dL    Specific Gravity Urine 1.003 1.003 - 1.035    Blood Urine Trace (A) NEG^Negative    pH Urine 6.0 5.0 - 7.0 pH    Protein Albumin Urine Negative NEG^Negative mg/dL    Urobilinogen mg/dL Normal 0.0 - 2.0 mg/dL    Nitrite Urine Negative NEG^Negative    Leukocyte Esterase Urine Negative NEG^Negative    Source Unspecified Urine     WBC Urine <1 0 - 5 /HPF    RBC Urine <1 0 - 2 /HPF   CBC with platelets differential   Result Value Ref Range    WBC 8.3 4.0 - 11.0 10e9/L    RBC Count 5.08 4.4 - 5.9 10e12/L    Hemoglobin 15.3 13.3 - 17.7 g/dL    Hematocrit 43.3 40.0 - 53.0 %    MCV 85 78 - 100 fl    MCH 30.1 26.5 - 33.0 pg    MCHC 35.3 31.5 - 36.5 g/dL    RDW 12.3 10.0 - 15.0 %    Platelet Count 239 150 - 450 10e9/L    Diff Method Automated Method     % Neutrophils 69.2 %    % Lymphocytes 22.5 %    % Monocytes 5.5 %    % Eosinophils 2.4 %    % Basophils 0.2 %    % Immature Granulocytes 0.2 %     Nucleated RBCs 0 0 /100    Absolute Neutrophil 5.7 1.6 - 8.3 10e9/L    Absolute Lymphocytes 1.9 0.8 - 5.3 10e9/L    Absolute Monocytes 0.5 0.0 - 1.3 10e9/L    Absolute Eosinophils 0.2 0.0 - 0.7 10e9/L    Absolute Basophils 0.0 0.0 - 0.2 10e9/L    Abs Immature Granulocytes 0.0 0 - 0.4 10e9/L    Absolute Nucleated RBC 0.0    Comprehensive metabolic panel   Result Value Ref Range    Sodium 141 133 - 144 mmol/L    Potassium 3.7 3.4 - 5.3 mmol/L    Chloride 106 94 - 109 mmol/L    Carbon Dioxide 27 20 - 32 mmol/L    Anion Gap 8 3 - 14 mmol/L    Glucose 88 70 - 99 mg/dL    Urea Nitrogen 15 7 - 30 mg/dL    Creatinine 0.99 0.66 - 1.25 mg/dL    GFR Estimate 90 >60 mL/min/1.7m2    GFR Estimate If Black >90 >60 mL/min/1.7m2    Calcium 8.9 8.5 - 10.1 mg/dL    Bilirubin Total 0.4 0.2 - 1.3 mg/dL    Albumin 4.0 3.4 - 5.0 g/dL    Protein Total 7.3 6.8 - 8.8 g/dL    Alkaline Phosphatase 60 40 - 150 U/L    ALT 17 0 - 70 U/L    AST 19 0 - 45 U/L   Lipase   Result Value Ref Range    Lipase 177 73 - 393 U/L   D dimer quantitative   Result Value Ref Range    D Dimer <0.3 0.0 - 0.50 ug/ml FEU   Abd/pelvis CT no contrast - Stone Protocol    Narrative    CT ABDOMEN AND PELVIS WITHOUT CONTRAST  11/26/2018 11:17 PM     HISTORY: Right flank pain.    COMPARISON: None.    TECHNIQUE: Without intravenous or oral contrast, helical sections were  acquired from the top of the diaphragm through the pubic symphysis.  Coronal reconstructions were generated. Radiation dose for this scan  was reduced using automated exposure control, adjustment of the mA  and/or kV according to the patient's size, or iterative reconstruction  technique. (Renal stone protocol).    FINDINGS:  Right urinary tract: No renal or ureteral calculi. No convincing  dilatation of the intrarenal collecting system or ureter.    Left urinary tract: No convincing renal or ureteral calculi. No  dilatation of the intrarenal collecting system or ureter.    Urinary bladder: No visualized  calculi.    Remainder of the abdomen and pelvis: The liver, spleen, pancreas and  adrenal glands are unremarkable to the limits of a noncontrast CT  scan. The gallbladder is present. The small and large bowel are normal  in caliber. The appendix is unremarkable. No bowel wall thickening,  pneumatosis or free intraperitoneal gas. No enlarged lymph nodes or  free fluid in the abdomen or pelvis. Tiny paraumbilical hernia  containing fat.    Scan through the lower chest is significant for minimal linear  opacities in the left lung base that most likely represent  atelectasis.      Impression    IMPRESSION:  1. No convincing cause of acute pain identified in the abdomen or  pelvis.  2. No convincing renal or ureteral calculi or evidence of urinary  obstruction.    CLARI MARMOLEJO MD      Medications   0.9% sodium chloride BOLUS (1,000 mLs Intravenous New Bag 11/26/18 2242)   ketorolac (TORADOL) injection 30 mg (30 mg Intravenous Given 11/26/18 2242)   morphine (PF) injection 4 mg (4 mg Intravenous Given 11/26/18 2242)             Assessments & Plan (with Medical Decision Making)   28 year old male to the emergency department with 1 day history of right flank pain without other associated symptoms.  Patient has normal vital signs here in the emergency department.  He appears uncomfortable but otherwise has a normal exam.  The patient's labs are normal including a d-dimer.  Do not suspect pulmonary embolus as cause for his flank pain as he also has normal heart rate and oxygen saturations.  The patient's urinalysis is normal.  CT abdomen/pelvis does not reveal any acute abnormality including no ureterolithiasis.  The patient's labs are otherwise normal.  He is comfortable after the above therapies.  He appears appropriate for outpatient management.  Be discharged home.  Ibuprofen recommended for pain.  Primary care follow-up in 1 week as needed.    I have reviewed the nursing notes.    I have reviewed the findings,  diagnosis, plan and need for follow up with the patient.    New Prescriptions    No medications on file       Final diagnoses:   Right flank pain       11/26/2018   [facility], [place], EMERGENCY DEPARTMENT     Mason Sims MD  11/27/18 0042       Mason Sims MD  11/27/18 0042

## 2018-11-27 NOTE — DISCHARGE INSTRUCTIONS
Take ibuprofen 600 mg every 6 hours with food as needed for pain.      Please make an appointment to follow up with Your Primary Care Provider in 1 week as needed.

## 2019-02-15 ENCOUNTER — OFFICE VISIT (OUTPATIENT)
Dept: FAMILY MEDICINE | Facility: CLINIC | Age: 29
End: 2019-02-15
Payer: MEDICAID

## 2019-02-15 VITALS
DIASTOLIC BLOOD PRESSURE: 81 MMHG | WEIGHT: 183.2 LBS | BODY MASS INDEX: 26.23 KG/M2 | OXYGEN SATURATION: 97 % | HEIGHT: 70 IN | TEMPERATURE: 98.7 F | SYSTOLIC BLOOD PRESSURE: 122 MMHG | HEART RATE: 81 BPM

## 2019-02-15 DIAGNOSIS — I82.601 THROMBOSIS OF RIGHT UPPER EXTREMITY: ICD-10-CM

## 2019-02-15 DIAGNOSIS — R60.0 ARM EDEMA: Primary | ICD-10-CM

## 2019-02-15 DIAGNOSIS — Z00.00 ENCOUNTER FOR MEDICAL EXAMINATION TO ESTABLISH CARE: ICD-10-CM

## 2019-02-15 SDOH — HEALTH STABILITY: MENTAL HEALTH: HOW OFTEN DO YOU HAVE A DRINK CONTAINING ALCOHOL?: NEVER

## 2019-02-15 ASSESSMENT — MIFFLIN-ST. JEOR: SCORE: 1809.73

## 2019-02-15 NOTE — PROGRESS NOTES
New Patient Note-Male          HPI   Note patient has a prior medical chart if you search his old phone number. MRN   [id number]      [place]. Been here 3 years. , no trouble shifting gears right now.     Concerns today: Arm swelling  - had blood clot in his right arm. 4 weeks. Xarelto. February/March of last year. In [place] system? States he took Xarelto prescribed by ED for 4 weeks. Never saw a doctor afterwards.   - never completed his Follow-up w vascular doctors, has not had hypercoagulably work up.     - This week when he is flexing his arm it feels swollen again.  Started about last week. Now he fulls fullness in his right shoulder like last time but this time feels it along his right neck/face as well.   - Some numbness in his hand at night time all his fingers. No weakness or tingling.     There is no problem list on file for this patient.      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      History reviewed. No pertinent family history.           Review of Systems:     Review of Systems:  CONSTITUTIONAL: NEGATIVE for fever, chills, change in weight  INTEGUMENTARY/SKIN: NEGATIVE for worrisome rashes, moles or lesions  EYES: NEGATIVE for vision changes or irritation  ENT/MOUTH: NEGATIVE for ear, mouth and throat problems  RESP: NEGATIVE for significant cough or SOB  BREAST: + swelling no tenderness   CV: NEGATIVE for chest pain, palpitations or peripheral edema other than above stated.   GI: NEGATIVE for nausea, abdominal pain, heartburn, or change in bowel habits  : NEGATIVE for frequency, dysuria, or hematuria  MUSCULOSKELETAL: NEGATIVE for significant arthralgias or myalgia  NEURO: NEGATIVE for weakness, dizziness or paresthesias  ENDOCRINE: NEGATIVE for temperature intolerance, skin/hair changes  HEME/ALLERGY: NEGATIVE for  "bleeding problems  PSYCHIATRIC: NEGATIVE for changes in mood or affect  Sleep:   Do you snore most or the night (as reported by a family member)? No  Do you feel sleepy or extremely tired during most of the day? No             Social History     Social History     Tobacco Use     Smoking status: Never Smoker   Substance Use Topics     Alcohol use: No     Frequency: Never       Marital Status:Single  Who lives in your household? Lives in apartment with 1 roommate     Has anyone hurt you physically, for example by pushing, hitting, slapping or kicking you or forcing you to have sex? Denies  Do you feel threatened or controlled by a partner, ex-partner or anyone in your life? Denies    Sexual Health     Sexual concerns: No, never been sexually active   STI History: Neg           Physical Exam:     Vitals: /81   Pulse 81   Temp 98.7  F (37.1  C) (Oral)   Ht 1.79 m (5' 10.47\")   Wt 83.1 kg (183 lb 3.2 oz)   SpO2 97%   BMI 25.94 kg/m    BMI= Body mass index is 25.94 kg/m .     Physical Exam   Constitutional: He is oriented to person, place, and time. He appears well-developed and well-nourished. No distress.   HENT:   Head: Normocephalic and atraumatic.   Eyes: Conjunctivae and EOM are normal. Right eye exhibits no discharge. Left eye exhibits no discharge.   Neck: Normal range of motion.   No cervical adenopathy but fullness palpable along right cervical chain. JVD noted   Cardiovascular: Normal rate and regular rhythm.   No murmur heard.  Pulmonary/Chest: Effort normal and breath sounds normal. No respiratory distress. He has no wheezes.   Abdominal: Soft. Bowel sounds are normal. He exhibits no distension and no mass.   Musculoskeletal: Normal range of motion. He exhibits no tenderness.   Right bicep 13 inches compared to 11 on left. Mild venous distention in right shoulder and arm, latissmus edema also present. No appreciable jugular venous distension. Fullness along right cervical chain palpable.  "   Neurological: He is alert and oriented to person, place, and time. He displays normal reflexes. No cranial nerve deficit.   Skin: Skin is warm and dry. Capillary refill takes less than 2 seconds.   Psychiatric: He has a normal mood and affect.       Assessment and Plan      Said was seen today for musculoskeletal problem.    Diagnoses and all orders for this visit:    Arm edema    Other orders  -     Cancel: US Upper Ext Art Thoracic Outlet Syndrome Rt; Future      Patient Instructions     I have concerns this is a clot again. You have had one in your axillary and subclavian vein before. These are veins in your arm/shoulder.  I have concerns about new clots there or something called superior vena cava syndrome since you are having symptoms in your right neck as well. Since it is Friday afternoon I think going to the hospital is our best option. If there is a clot again I will want you to go see the vascular doctors as an outpatient.      Patient agreed to go to [place] ED after dropping his friend off.     At our next visit will review need for HIV labs, flu shot, Tdap. Will also review ED/Hospital visit and likely refer to vascular if clot is found.     Richi Cevallos MD     Here is the hospital I work at     [address] [place] [address]       Options for treatment and follow-up care were reviewed with the patient . Said Siad  engaged in the decision making process and verbalized understanding of the options discussed and agreed with the final plan.    Richi Cevallos MD

## 2019-02-15 NOTE — Clinical Note
Hey there ! Hoping you could work this weston in. Will be his new PCP and super interesting. Would prefer him to see me. Thanks ! Richi

## 2019-02-15 NOTE — PATIENT INSTRUCTIONS
1.   I have concerns this is a clot again. You have had one in your axillary and subclavian vein before. I have concerns about new clots there or superior vena cava syndrome since you are having symptoms in your right neck as well. Since it is Friday afternoon I think going to the hospital is our best option. If there is a clot again I will want you to go see the vascular doctors as an outpatient.      Richi Cevallos MD     Here is the hospital I work at     [address] [id number]     Here is the plan from today's visit    Thank you for coming to [facility] today.  Lab Testing:  **If you had lab testing today and your results are reassuring or normal they will be mailed to you or sent through [employer] within 7 days.   **If the lab tests need quick action we will call you with the results.  The phone number we will call with results is # [phone] (home) . If this is not the best number please call our clinic and change the number.  Medication Refills:  If you need any refills please call your pharmacy and they will contact us.   If you need to  your refill at a new pharmacy, please contact the new pharmacy directly. The new pharmacy will help you get your medications transferred faster.   Scheduling:  If you have any concerns about today's visit or wish to schedule another appointment please call our office during normal business hours [phone] (8-5:00 M-F)  If a referral was made to a [facility] and you don't get a call from central scheduling please call [phone].  If a Mammogram was ordered for you at [facility] call [phone] to schedule or change your appointment.  If you had an XRay/CT/Ultrasound/MRI ordered the number is [phone] to schedule or change your radiology appointment.   Medical Concerns:  If you have urgent medical concerns please call [phone] at any time of the day.    Richi Cevallos MD

## 2019-02-15 NOTE — PROGRESS NOTES
Preceptor Attestation:   Patient seen and discussed with the resident. Assessment and plan reviewed with resident and agreed upon.   Supervising Physician:  [facility]

## 2019-02-16 ENCOUNTER — APPOINTMENT (OUTPATIENT)
Dept: CT IMAGING | Facility: CLINIC | Age: 29
End: 2019-02-16
Attending: EMERGENCY MEDICINE

## 2019-02-16 ENCOUNTER — HOSPITAL ENCOUNTER (EMERGENCY)
Facility: CLINIC | Age: 29
Discharge: HOME | End: 2019-02-16
Attending: EMERGENCY MEDICINE | Admitting: EMERGENCY MEDICINE

## 2019-02-16 ENCOUNTER — APPOINTMENT (OUTPATIENT)
Dept: ULTRASOUND IMAGING | Facility: CLINIC | Age: 29
End: 2019-02-16
Attending: EMERGENCY MEDICINE

## 2019-02-16 VITALS
WEIGHT: 183 LBS | HEIGHT: 72 IN | SYSTOLIC BLOOD PRESSURE: 114 MMHG | HEART RATE: 81 BPM | DIASTOLIC BLOOD PRESSURE: 76 MMHG | OXYGEN SATURATION: 100 % | RESPIRATION RATE: 31 BRPM | TEMPERATURE: 98 F | BODY MASS INDEX: 24.79 KG/M2

## 2019-02-16 DIAGNOSIS — I82.90 DEEP VEIN THROMBOSIS (DVT) OF NON-EXTREMITY VEIN, UNSPECIFIED CHRONICITY: ICD-10-CM

## 2019-02-16 LAB
ANION GAP SERPL CALCULATED.3IONS-SCNC: 5 MMOL/L (ref 3–14)
BASOPHILS # BLD AUTO: 0.1 10E9/L (ref 0–0.2)
BASOPHILS NFR BLD AUTO: 1 %
BUN SERPL-MCNC: 11 MG/DL (ref 7–30)
CALCIUM SERPL-MCNC: 9.2 MG/DL (ref 8.5–10.1)
CHLORIDE SERPL-SCNC: 102 MMOL/L (ref 94–109)
CO2 SERPL-SCNC: 30 MMOL/L (ref 20–32)
CREAT SERPL-MCNC: 0.94 MG/DL (ref 0.66–1.25)
DIFFERENTIAL METHOD BLD: NORMAL
EOSINOPHIL # BLD AUTO: 0.3 10E9/L (ref 0–0.7)
EOSINOPHIL NFR BLD AUTO: 4.8 %
ERYTHROCYTE [DISTWIDTH] IN BLOOD BY AUTOMATED COUNT: 12.5 % (ref 10–15)
GFR SERPL CREATININE-BSD FRML MDRD: >90 ML/MIN/{1.73_M2}
GLUCOSE SERPL-MCNC: 93 MG/DL (ref 70–99)
HCT VFR BLD AUTO: 44.9 % (ref 40–53)
HGB BLD-MCNC: 15.8 G/DL (ref 13.3–17.7)
IMM GRANULOCYTES # BLD: 0 10E9/L (ref 0–0.4)
IMM GRANULOCYTES NFR BLD: 0.3 %
LYMPHOCYTES # BLD AUTO: 2.5 10E9/L (ref 0.8–5.3)
LYMPHOCYTES NFR BLD AUTO: 39.5 %
MCH RBC QN AUTO: 30.5 PG (ref 26.5–33)
MCHC RBC AUTO-ENTMCNC: 35.2 G/DL (ref 31.5–36.5)
MCV RBC AUTO: 87 FL (ref 78–100)
MONOCYTES # BLD AUTO: 0.4 10E9/L (ref 0–1.3)
MONOCYTES NFR BLD AUTO: 6.6 %
NEUTROPHILS # BLD AUTO: 3 10E9/L (ref 1.6–8.3)
NEUTROPHILS NFR BLD AUTO: 47.8 %
NRBC # BLD AUTO: 0 10*3/UL
NRBC BLD AUTO-RTO: 0 /100
PLATELET # BLD AUTO: 258 10E9/L (ref 150–450)
POTASSIUM SERPL-SCNC: 3.7 MMOL/L (ref 3.4–5.3)
RBC # BLD AUTO: 5.18 10E12/L (ref 4.4–5.9)
SODIUM SERPL-SCNC: 136 MMOL/L (ref 133–144)
WBC # BLD AUTO: 6.3 10E9/L (ref 4–11)

## 2019-02-16 PROCEDURE — 99285 EMERGENCY DEPT VISIT HI MDM: CPT | Mod: 25 | Performed by: EMERGENCY MEDICINE

## 2019-02-16 PROCEDURE — 71260 CT THORAX DX C+: CPT

## 2019-02-16 PROCEDURE — 96360 HYDRATION IV INFUSION INIT: CPT | Performed by: EMERGENCY MEDICINE

## 2019-02-16 PROCEDURE — 96361 HYDRATE IV INFUSION ADD-ON: CPT | Performed by: EMERGENCY MEDICINE

## 2019-02-16 PROCEDURE — 80048 BASIC METABOLIC PNL TOTAL CA: CPT | Performed by: EMERGENCY MEDICINE

## 2019-02-16 PROCEDURE — 25000125 ZZHC RX 250: Performed by: STUDENT IN AN ORGANIZED HEALTH CARE EDUCATION/TRAINING PROGRAM

## 2019-02-16 PROCEDURE — 93005 ELECTROCARDIOGRAM TRACING: CPT | Performed by: EMERGENCY MEDICINE

## 2019-02-16 PROCEDURE — 25000128 H RX IP 250 OP 636: Performed by: EMERGENCY MEDICINE

## 2019-02-16 PROCEDURE — 25000128 H RX IP 250 OP 636: Performed by: STUDENT IN AN ORGANIZED HEALTH CARE EDUCATION/TRAINING PROGRAM

## 2019-02-16 PROCEDURE — 93010 ELECTROCARDIOGRAM REPORT: CPT | Mod: Z6 | Performed by: EMERGENCY MEDICINE

## 2019-02-16 PROCEDURE — 93971 EXTREMITY STUDY: CPT | Mod: RT

## 2019-02-16 PROCEDURE — 85025 COMPLETE CBC W/AUTO DIFF WBC: CPT | Performed by: EMERGENCY MEDICINE

## 2019-02-16 RX ORDER — IOPAMIDOL 755 MG/ML
63 INJECTION, SOLUTION INTRAVASCULAR ONCE
Status: COMPLETED | OUTPATIENT
Start: 2019-02-16 | End: 2019-02-16

## 2019-02-16 RX ADMIN — SODIUM CHLORIDE, PRESERVATIVE FREE 93 ML: 5 INJECTION INTRAVENOUS at 20:15

## 2019-02-16 RX ADMIN — IOPAMIDOL 63 ML: 755 INJECTION, SOLUTION INTRAVENOUS at 20:15

## 2019-02-16 RX ADMIN — SODIUM CHLORIDE 1000 ML: 9 INJECTION, SOLUTION INTRAVENOUS at 19:18

## 2019-02-16 ASSESSMENT — ENCOUNTER SYMPTOMS
CHILLS: 0
SHORTNESS OF BREATH: 1
NECK PAIN: 1
SORE THROAT: 0
FEVER: 0
HEADACHES: 1

## 2019-02-16 ASSESSMENT — MIFFLIN-ST. JEOR: SCORE: 1833.08

## 2019-02-16 NOTE — ED TRIAGE NOTES
Said comes in for right arm swelling and weakness with a PMH of blood clots in his axillary and subclavian veins (last year). He also complains of a right sided HA, neck pain and ear pain. He was seen in clinic yesterday and was recommended to be seen here for possible blood clots. He just returned from a long road trip from [place].

## 2019-02-16 NOTE — ED AVS SNAPSHOT
[facility], [place], Emergency Department  [address] [address]  Phone:  [phone]                                    Adriana Singh   MRN: [id number]    Department:  [facility], Emergency Department   Date of Visit:  2/16/2019           After Visit Summary Signature Page    I have received my discharge instructions, and my questions have been answered. I have discussed any challenges I see with this plan with the nurse or doctor.    ..........................................................................................................................................  Patient/Patient Representative Signature      ..........................................................................................................................................  Patient Representative Print Name and Relationship to Patient    ..................................................               ................................................  Date                                   Time    ..........................................................................................................................................  Reviewed by Signature/Title    ...................................................              ..............................................  Date                                               Time          22EPIC Rev 08/18

## 2019-02-16 NOTE — ED PROVIDER NOTES
[place] EMERGENCY DEPARTMENT ([facility])  2/16/19   History     Chief Complaint   Patient presents with     Headache     The history is provided by the patient and medical records.     Adriana Singh is a 29 year old male who has a PMHx of DVT, who presents to the Emergency Department for evaluation of right sided headache and right arm swelling.  The patient was prevously seen on 2/12/2018 and 4/26/2018 for acute right upper exremity DVTs here in the ED. He was prescribed anticoagulants at states that he took these for a month and discontinuing them. The patient is uncertain when asked about his follow up following thse encounters.  The patient was seen by his PCP at [facility] yesterday (MRN [id number]), who was concerned for recurrent DVT and recommended he come to the ED that day. The patient presents today, with swelling of his right arm for the last 5 days. Since yesterday, he has had slight shortness of breath with exertion.  He also has right upper neck pain, and right-sided headache aggravated by lying down.  He has been using ibuprofen with some relief.  He states that he is a  and routinely stays seated for up to 11 hours/day.  He states that last time he worked was Wednesday when he was coming in from [place] and that drive was 6 hours.  He denies lower extremity edema.  He states he has no family history of DVT or PE.  Patient denies any recent trauma or injury.  He is right-handed.    I have reviewed the Medications, Allergies, Past Medical and Surgical History, and Social History in the Epic system.    Review of Systems   Constitutional: Negative for chills and fever.   HENT: Negative for sore throat.    Respiratory: Positive for shortness of breath (with exertion).    Cardiovascular: Negative for leg swelling.   Musculoskeletal: Positive for neck pain (right sided upper neck pain).        Positive for right arm swelling     Neurological: Positive for headaches (right sided  aggravated when lying down ).   All other systems reviewed and are negative.      Physical Exam   BP: 123/81  Pulse: 78  Heart Rate: 67  Temp: 98  F (36.7  C)  Resp: 16  Height: 182.9 cm (6')  Weight: 83 kg (183 lb)  SpO2: 100 %      Physical Exam   Constitutional: He is oriented to person, place, and time. He appears well-developed and well-nourished.   HENT:   Head: Normocephalic.   Mouth/Throat: Oropharynx is clear and moist.   Eyes: Pupils are equal, round, and reactive to light.   Cardiovascular: Normal rate, regular rhythm and normal heart sounds. Exam reveals no gallop.   No murmur heard.  Pulmonary/Chest: Effort normal and breath sounds normal. No respiratory distress. He has no wheezes. He has no rales.   Abdominal: Soft. Bowel sounds are normal. He exhibits no distension. There is no tenderness. There is no rebound and no guarding.   Musculoskeletal:   Some slight asymmetry of RUE compared to LUE, mild swelling   Neurological: He is alert and oriented to person, place, and time. He has normal strength. No cranial nerve deficit or sensory deficit. He displays a negative Romberg sign.   Skin: Skin is warm and dry.   Psychiatric: He has a normal mood and affect. His behavior is normal.   Nursing note and vitals reviewed.      ED Course   5:57 PM  The patient was seen and examined by Rosy Norris MD in Room ED09.       Procedures             EKG Interpretation:      Interpreted by Rosy Norris  Time reviewed: 1830  Symptoms at time of EKG: None   Rhythm: sinus bradycardia  Rate: 50-60  Axis: Normal  Ectopy: none  Conduction: normal  ST Segments/ T Waves: No acute ischemic changes  Q Waves: none  Comparison to prior: Unchanged    Clinical Impression: sinus bradycardia           Results for orders placed or performed during the hospital encounter of 02/16/19   US Upper Extremity Venous Duplex Right    Narrative    EXAMINATION: US UPPER EXTREMITY VENOUS DUPLEX RIGHT, 2/16/2019 7:05 PM      COMPARISON:  Ultrasound upper extremity right 2/12/2018    HISTORY: History of right upper extremity DVT, now with swelling,  evaluate for recurrent DVT    TECHNIQUE:  Gray-scale evaluation with compression, spectral flow and  color Doppler assessment of the deep venous system of the right upper  extremity.    FINDINGS:  Nonocclusive thrombus in the medial subclavian vein.Normal blood flow  and waveforms are demonstrated in the right internal jugular,  innominate and axillary veins. There is normal compressibility of  right basilic vein. Right cephalic vein is not visualized.      Impression    IMPRESSION: Nonocclusive thrombus in the medial right subclavian vein.    I have personally reviewed the examination and initial interpretation  and I agree with the findings.    AMY LICONA MD   Chest CT, IV contrast only - PE protocol    Narrative    EXAMINATION: CT CHEST PULMONARY EMBOLISM W CONTRAST, 2/16/2019 8:23 PM    CLINICAL HISTORY: prior hx of DVT, now with SOB, eval for PE, prior hx  of DVT, now with SOB, eval for PE    COMPARISON: Chest CT 4/26/2018.    TECHNIQUE: CT imaging obtained through the chest with contrast.  Coronal and axial MIP reformatted images obtained. Three-dimensional  (3D) post-processed angiographic images were reconstructed, archived  to PACS and used in interpretation of this study.     Contrast: 63cc of Isovue 370     FINDINGS:    Lines and tubes: None.    Vessels: No central filling defect consistent with a pulmonary  embolism.  No aortic aneurysm.     Mediastinum: No thyroid nodules. Central tracheobronchial tree is  patent. Heart size is normal. No pericardial effusion.  Normal  thoracic vasculature. No thoracic lymphadenopathy.     Lungs: No consolidation. No pleural effusion or pneumothorax.    Bones and soft tissues: No suspicious bone findings.     Upper abdomen: Limited.      Impression    IMPRESSION:   1. No central filling defect consistent with a pulmonary embolism.     I have personally  reviewed the examination and initial interpretation  and I agree with the findings.    AMY LICONA MD   CBC with platelets differential   Result Value Ref Range    WBC 6.3 4.0 - 11.0 10e9/L    RBC Count 5.18 4.4 - 5.9 10e12/L    Hemoglobin 15.8 13.3 - 17.7 g/dL    Hematocrit 44.9 40.0 - 53.0 %    MCV 87 78 - 100 fl    MCH 30.5 26.5 - 33.0 pg    MCHC 35.2 31.5 - 36.5 g/dL    RDW 12.5 10.0 - 15.0 %    Platelet Count 258 150 - 450 10e9/L    Diff Method Automated Method     % Neutrophils 47.8 %    % Lymphocytes 39.5 %    % Monocytes 6.6 %    % Eosinophils 4.8 %    % Basophils 1.0 %    % Immature Granulocytes 0.3 %    Nucleated RBCs 0 0 /100    Absolute Neutrophil 3.0 1.6 - 8.3 10e9/L    Absolute Lymphocytes 2.5 0.8 - 5.3 10e9/L    Absolute Monocytes 0.4 0.0 - 1.3 10e9/L    Absolute Eosinophils 0.3 0.0 - 0.7 10e9/L    Absolute Basophils 0.1 0.0 - 0.2 10e9/L    Abs Immature Granulocytes 0.0 0 - 0.4 10e9/L    Absolute Nucleated RBC 0.0    Basic metabolic panel   Result Value Ref Range    Sodium 136 133 - 144 mmol/L    Potassium 3.7 3.4 - 5.3 mmol/L    Chloride 102 94 - 109 mmol/L    Carbon Dioxide 30 20 - 32 mmol/L    Anion Gap 5 3 - 14 mmol/L    Glucose 93 70 - 99 mg/dL    Urea Nitrogen 11 7 - 30 mg/dL    Creatinine 0.94 0.66 - 1.25 mg/dL    GFR Estimate >90 >60 mL/min/[1.73_m2]    GFR Estimate If Black >90 >60 mL/min/[1.73_m2]    Calcium 9.2 8.5 - 10.1 mg/dL   EKG 12 lead   Result Value Ref Range    Interpretation ECG Click View Image link to view waveform and result               Assessments & Plan (with Medical Decision Making)   This is a 29-year-old male who presents to the ED today after being referred by his primary clinic which is [facility] to come here.  He has a prior history of a right upper extremity DVT.  The patient evidently took anticoagulation for a month afterwards though he cannot remember what it was and he gives a very unclear history of follow-up afterwards.  I suspect he did not see anyone and  simply did not have his prescription renewed.  He now comes in complaining of some right upper extremity swelling went to [facility] yesterday.  Please note that he has a different medical record number for that visit, MR number is [id number].  The [facility] provider was concerned about recurrent DVT and advised that he come to the emergency department yesterday.  He did not do so and instead came today.  He is noting some swelling of his arm as well as some shortness of breath particularly with exertion.  This is very concerning for the possibility of PE.  He is also complaining of a slight headache which is worse when he lies down.  Certainly if he has a right upper extremity DVT that could be propagating into his neck impaired venous drainage could cause these symptoms.    We did establish IV access and we did draw blood for laboratory analysis. CBC is within normal limits with a normal white count, normal platelets.  Basic metabolic panel is also normal.  Duplex venous ultrasound of the right upper extremity demonstrates nonocclusive thrombus in the medial right subclavian vein.  Due to his shortness of breath we did do a chest CT which demonstrates no central filling defect consistent with a PE.    I had an extensive discussion with the patient where I explained that he does have a recurrent DVT, at this time of the right subclavian vein.  This likely what is causing his symptoms.  I will give him a prescription for Xarelto.  He can take 15 mg p.o. twice daily times 3 weeks, then will transition to 20 mg p.o. daily.  I explained to the patient that given his multiple recurrent episodes of DVTs he will likely need to be anticoagulated lifetime.  I did explain that he needs to follow-up with his primary clinic which is [facility].  They will need to provide refills for him and I explicitly told him that he will need to get a refill after this prescription runs out.  He should follow-up within the next few weeks for  recheck.  Patient verbalizes understanding.    This part of the medical record was transcribed by Octavio Burciaga, Medical Scribe, from a dictation done by Rosy Norris MD.      I have reviewed the nursing notes.    I have reviewed the findings, diagnosis, plan and need for follow up with the patient.       Medication List      Started    Rivaroxaban 15 & 20 MG Tbpk  Commonly known as:  XARELTO STARTER PACK  Take 15 mg PO BID daily x 21 days, then take 20 mg PO daily.  Follow up with your primary clinic before you are finished with the medication as you will need refills from them                Final diagnoses:   Deep vein thrombosis (DVT) of non-extremity vein, unspecified chronicity - recurrent R subclavian DVT     I, Octavio Burciaga, am serving as a trained medical scribe to document services personally performed by Rosy Norris MD, based on the provider's statements to me.   I, Rosy Norris MD, was physically present and have reviewed and verified the accuracy of this note documented by Octavio Burciaga.     2/16/2019   [facility], [place], EMERGENCY DEPARTMENT     Rosy Norris MD  02/16/19 2854

## 2019-02-17 LAB — INTERPRETATION ECG - MUSE: NORMAL

## 2019-02-17 NOTE — DISCHARGE INSTRUCTIONS
You have been seen in the ER for a blood clot in your right subclavian vein.  You need to take blood thinners daily to help prevent more clots.  Take the medicine as directed (15 mg twice daily for 3 weeks, then 20 mg once daily).  You WILL NEED REFILLS OF THIS MEDICINE AND WILL LIKELY BE ON THIS MEDICINE FOR THE REST OF YOUR LIFE.  Follow up with your primary clinic next week for a recheck.

## 2019-02-28 NOTE — PROGRESS NOTES
"Spoke with patient, he will call to schedule F/U later in the week when his insurance is \"figured out\".    Ita Ferrara, Patient Representative     "

## 2019-04-02 ENCOUNTER — OFFICE VISIT (OUTPATIENT)
Dept: FAMILY MEDICINE | Facility: CLINIC | Age: 29
End: 2019-04-02
Payer: MEDICAID

## 2019-04-02 VITALS
SYSTOLIC BLOOD PRESSURE: 132 MMHG | HEART RATE: 85 BPM | BODY MASS INDEX: 25.66 KG/M2 | DIASTOLIC BLOOD PRESSURE: 85 MMHG | OXYGEN SATURATION: 98 % | WEIGHT: 189.2 LBS

## 2019-04-02 DIAGNOSIS — I82.601 THROMBOSIS OF RIGHT UPPER EXTREMITY: ICD-10-CM

## 2019-04-02 DIAGNOSIS — Z23 ENCOUNTER FOR IMMUNIZATION: Primary | ICD-10-CM

## 2019-04-02 ASSESSMENT — ENCOUNTER SYMPTOMS
LIGHT-HEADEDNESS: 0
HEADACHES: 0
WHEEZING: 0
FEVER: 0
WEAKNESS: 1
NECK PAIN: 0
NUMBNESS: 1
JOINT SWELLING: 0
MYALGIAS: 0
ARTHRALGIAS: 0
CHILLS: 0
SEIZURES: 0
SHORTNESS OF BREATH: 0
BACK PAIN: 0
ACTIVITY CHANGE: 0
CHEST TIGHTNESS: 0
ADENOPATHY: 0
PALPITATIONS: 0
DIZZINESS: 0

## 2019-04-02 NOTE — PATIENT INSTRUCTIONS
1. I will call radiology and have them review scans to see if there is a reason we keep getting these symptoms.     2. They will call you again for scheduling the ultrasound again.     Here is the plan from today's visit    1. Thrombosis of right upper extremity    - rivaroxaban ANTICOAGULANT (XARELTO) 20 MG TABS tablet; Take 1 tablet (20 mg) by mouth daily (with dinner)  Dispense: 30 tablet; Refill: 11  - US Upper Ext Art Thoracic Outlet Syndrome Rt; Future    Keep taking the xarelto . You have 11 refills     Schedule a physical with me sometime this year     Please call or return to clinic if your symptoms don't go away.    Thank you for coming to [facility] today.  Lab Testing:  **If you had lab testing today and your results are reassuring or normal they will be mailed to you or sent through [employer] within 7 days.   **If the lab tests need quick action we will call you with the results.  The phone number we will call with results is # [phone] (home) . If this is not the best number please call our clinic and change the number.  Medication Refills:  If you need any refills please call your pharmacy and they will contact us.   If you need to  your refill at a new pharmacy, please contact the new pharmacy directly. The new pharmacy will help you get your medications transferred faster.   Scheduling:  If you have any concerns about today's visit or wish to schedule another appointment please call our office during normal business hours [phone] (8-5:00 M-F)  If a referral was made to a [facility] and you don't get a call from central scheduling please call [phone].  If a Mammogram was ordered for you at [facility] call [phone] to schedule or change your appointment.  If you had an XRay/CT/Ultrasound/MRI ordered the number is [phone] to schedule or change your radiology appointment.   Medical Concerns:  If you have urgent medical concerns please call  [phone] at any time of the day.    Richi Cevallos MD

## 2019-04-02 NOTE — PROGRESS NOTES
HPI       Said Francisco is a 29 year old  who presents for   Chief Complaint   Patient presents with     Refill Request     Medication refill     RECHECK     Right arm, thrombosis         Concern: Upper extremity    Description of the problem :     - Is taking xarelto without problem. His tingling and swelling got better but it seems like for past couple of weeks swelling and tingling in his arm from right elbow down.  It feels a little different than the swelling distention that he had when he last saw me in February.  - New - also gets hand weakness when the symptoms occur. Tingling in all fingers. Seems to be brought on by flexion at the elbow.  He denies any neck or shoulder pain, the symptoms are not exacerbated by typing at a computer and do not wake him up from sleep        Adherence and Exercise  Medication side effects: no  How often is a medication missed? Less than 1 time per week  Exercise:walking  4-5 days/week for an average of 15-30 minutes       +++++++      Problem, Medication and Allergy Lists were      Patient Active Problem List    Diagnosis Date Noted     Thrombosis of right upper extremity 02/15/2018     Priority: Medium     Look up his old medical chart via his old phone number.      ,     Current Outpatient Medications   Medication Sig Dispense Refill     Rivaroxaban (XARELTO STARTER PACK) 15 & 20 MG TBPK Take 15 mg PO BID daily x 21 days, then take 20 mg PO daily.  Follow up with your primary clinic before you are finished with the medication as you will need refills from them 49 each 0     rivaroxaban ANTICOAGULANT (XARELTO) 20 MG TABS tablet Take 1 tablet (20 mg) by mouth daily (with dinner) 30 tablet 11     IBUPROFEN PO      ,   No Known Allergies.    Patient is an established patient of this clinic..         Review of Systems:   Review of Systems   Constitutional: Negative for activity change, chills and fever.   Respiratory: Negative for chest tightness, shortness of breath and  wheezing.    Cardiovascular: Negative for chest pain, palpitations and leg swelling.   Musculoskeletal: Negative for arthralgias, back pain, joint swelling, myalgias and neck pain.   Neurological: Positive for weakness and numbness. Negative for dizziness, seizures, light-headedness and headaches.   Hematological: Negative for adenopathy.              Physical Exam:     Vitals: /85   Pulse 85   Wt 85.8 kg (189 lb 3.2 oz)   SpO2 98%   BMI 25.66 kg/m    BMI= Body mass index is 25.66 kg/m .     Physical Exam   Constitutional: He is oriented to person, place, and time. He appears well-developed and well-nourished. No distress.   HENT:   Head: Normocephalic and atraumatic.   Eyes: Conjunctivae and EOM are normal. Right eye exhibits no discharge. Left eye exhibits no discharge.   Neck: Normal range of motion.   No jvd present compared to last exam, no cervical adenopathy   Cardiovascular: Normal rate and regular rhythm.   No murmur heard.  Pulmonary/Chest: Effort normal and breath sounds normal. No respiratory distress. He has no wheezes.   Abdominal: Soft. Bowel sounds are normal. He exhibits no distension and no mass.   Musculoskeletal: Normal range of motion. He exhibits no tenderness. Distension of forearm veins distal to elbow. No distension in shoulder, bicep like in February. Strength and ROM at wrist, fingers bicep, tricep and shoulder full 5/5 . Bicep, tricep, wrist reflex 1+ bilaterally.     Neurological: He is alert and oriented to person, place, and time. He displays normal reflexes. No cranial nerve deficit.   Skin: Skin is warm and dry. Capillary refill takes less than 2 seconds.   Psychiatric: He has a normal mood and affect.       Assessment and Plan      Said was seen today for refill request and recheck.    Diagnoses and all orders for this visit:    Thrombosis of right upper extremity  -     rivaroxaban ANTICOAGULANT (XARELTO) 20 MG TABS tablet; Take 1 tablet (20 mg) by mouth daily (with  ricarda)  -     US Upper Ext Art Thoracic Outlet Syndrome Rt; Future      Patient Instructions   Question if return of distention type symptoms numbness and sporadic weakness are due to resolving subclavian clot or represent new clot. we will reimage with instructions to include going more distal including the elbow.  I will call radiology to see if they see any signs of why he has this clot in his upper extremity, although their read stated no abnormalities.  We will continue Xarelto.     Patient will schedule CPE with me, TDAP today, will obtain hypercoagulability and HIV at next visit.  Declined getting lab work today       There are no discontinued medications.    Options for treatment and follow-up care were reviewed with the patient. Adriana Singh  engaged in the decision making process and verbalized understanding of the options discussed and agreed with the final plan.    Richi Cevallos MD

## 2019-04-04 NOTE — PROGRESS NOTES
Preceptor Attestation:   Patient seen, evaluated and discussed with the resident. I have verified the content of the note, which accurately reflects my assessment of the patient and the plan of care.   Supervising Physician:  Bandar Larose MD

## 2019-04-24 ENCOUNTER — ANCILLARY PROCEDURE (OUTPATIENT)
Dept: ULTRASOUND IMAGING | Facility: CLINIC | Age: 29
End: 2019-04-24
Attending: FAMILY MEDICINE
Payer: MEDICAID

## 2019-04-24 DIAGNOSIS — I82.601 THROMBOSIS OF RIGHT UPPER EXTREMITY: ICD-10-CM

## 2019-05-01 DIAGNOSIS — I82.A11 ACUTE DEEP VEIN THROMBOSIS (DVT) OF AXILLARY VEIN OF RIGHT UPPER EXTREMITY (H): Primary | ICD-10-CM

## 2019-05-01 NOTE — PROGRESS NOTES
Called pt left voice mail and mychart about referral since US arm concerning for possible thoracic outlet syndrome

## 2019-05-02 ENCOUNTER — TELEPHONE (OUTPATIENT)
Dept: VASCULAR SURGERY | Facility: CLINIC | Age: 29
End: 2019-05-02

## 2019-05-02 NOTE — PROGRESS NOTES
Vascular Surgery referral  I spoke with this patient, he will call us back next week to schedule, he has the clinic's number.      Thank you!   [place]

## 2019-05-02 NOTE — TELEPHONE ENCOUNTER
Internal Referal from Richi Cevallos MD for recurrent RUE DVT. Coag work up negative but US showed concerning for thoracic outlet syndrome. Patient is new to Vascular. RUDY, JOO.

## 2019-05-28 NOTE — TELEPHONE ENCOUNTER
FUTURE VISIT INFORMATION      FUTURE VISIT INFORMATION:    Date: 5.30    Time: 340    Location: Vascular  REFERRAL INFORMATION:    Referring provider:  Dr. Cevallos      Referring providers clinic:  [facility]    Reason for visit/diagnosis  DVT    RECORDS REQUESTED FROM:       Clinic name Comments Records Status Imaging Status   [facility]  5.1.19 Referal  4.2.19 Office Visit  4.24.19 US Epic Epic

## 2019-05-30 ENCOUNTER — PRE VISIT (OUTPATIENT)
Dept: VASCULAR SURGERY | Facility: CLINIC | Age: 29
End: 2019-05-30

## 2019-06-03 DIAGNOSIS — G54.0 THORACIC OUTLET SYNDROME OF RIGHT THORACIC OUTLET: Primary | ICD-10-CM

## 2019-06-05 ENCOUNTER — ANCILLARY PROCEDURE (OUTPATIENT)
Dept: GENERAL RADIOLOGY | Facility: CLINIC | Age: 29
End: 2019-06-05
Attending: SURGERY
Payer: MEDICAID

## 2019-06-05 ENCOUNTER — ANCILLARY PROCEDURE (OUTPATIENT)
Dept: ULTRASOUND IMAGING | Facility: CLINIC | Age: 29
End: 2019-06-05
Attending: SURGERY
Payer: MEDICAID

## 2019-06-05 ENCOUNTER — OFFICE VISIT (OUTPATIENT)
Dept: VASCULAR SURGERY | Facility: CLINIC | Age: 29
End: 2019-06-05
Attending: STUDENT IN AN ORGANIZED HEALTH CARE EDUCATION/TRAINING PROGRAM
Payer: MEDICAID

## 2019-06-05 ENCOUNTER — PRE VISIT (OUTPATIENT)
Dept: VASCULAR SURGERY | Facility: CLINIC | Age: 29
End: 2019-06-05

## 2019-06-05 VITALS — HEART RATE: 62 BPM | SYSTOLIC BLOOD PRESSURE: 130 MMHG | DIASTOLIC BLOOD PRESSURE: 76 MMHG | OXYGEN SATURATION: 99 %

## 2019-06-05 DIAGNOSIS — G54.0 THORACIC OUTLET SYNDROME OF RIGHT THORACIC OUTLET: ICD-10-CM

## 2019-06-05 DIAGNOSIS — G54.0 THORACIC OUTLET SYNDROME OF RIGHT THORACIC OUTLET: Primary | ICD-10-CM

## 2019-06-05 DIAGNOSIS — I82.A11 ACUTE DEEP VEIN THROMBOSIS (DVT) OF AXILLARY VEIN OF RIGHT UPPER EXTREMITY (H): ICD-10-CM

## 2019-06-05 DIAGNOSIS — G54.0 THORACIC OUTLET SYNDROME OF LEFT THORACIC OUTLET: ICD-10-CM

## 2019-06-05 ASSESSMENT — PAIN SCALES - GENERAL: PAINLEVEL: MODERATE PAIN (4)

## 2019-06-05 NOTE — PROGRESS NOTES
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 We spent a significant amount of time discussing what effort thrombosis and venous thoracic outlet syndrome on the right and arterial thoracic outlet means on the left - via his . Unfortunately his venous thoracic outlet / effort thrombosis was just recently identified though his right subclavian DVT occurred last February 2018. We discussed the ongoing scarring that occurs of the vein as long as the extrinsic compression of the first rib and anterior scalene are in place. This can result in significant disability for him over time, particularly given his young age and his righthand dominance. 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 His left arm / hand are starting to have tingling / issues in his left shoulder with abduction as well. He will think about things and contact us if he would like to proceed with left transaxillary first rib resection.    Many thanks for involving me in the care of this very pleasant patient. Should any questions or concerns arise, please don't hesitate to contact me.    Warm Regards,    Trudy Morris MD, DFSVS, RPVI  Director, [place] [facility]  Professor and Chief, Vascular and Endovascular Surgery  [facility]  Cell: [phone]  [email or web address]           History of Present Illness:   Vera Singh is a 30 y/o righthanded male who was found to have RUE swelling and DVT in February 2018 - preceded by several weeks of right arm/hand tingling, intermittent pain and occasional swelling. He has remained on anticoagulation since that time but has continued to have issues  with swelling and arm/shoulder/right neck pain with 45-90 degree abduction. He began working out at [employer] with bicycling and biceps curls, hoping to improve his right arm, but this has not helped. Additionally he is starting to have some symptoms in his left shoulder and left hand when abducted. Is still on anticoagulation. 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 Does not do any unloading or stacking for work.            Review of Systems:   CONSTITUTIONAL:  No fevers or chills  EYES: negative for icterus  ENT:  negative for hearing loss, tinnitus and sore throat  RESPIRATORY:  negative for cough with sputum and dyspnea  CARDIOVASCULAR:  negative for chest pain, dyspnea  GASTROINTESTINAL:  negative for nausea, vomiting, diarrhea and constipation  GENITOURINARY:  negative for dysuria  HEME:  No easy bruising  INTEGUMENT:  negative for rash and pruritus  NEURO:  Negative for headache           Past Medical History:     Past Medical History:   Diagnosis Date     Acute deep vein thrombosis (DVT) of axillary vein of right upper extremity (H)      Arm vein blood clot 03/2018    Undocumented but stated 4 weeks xarelto             Past Surgical History:   No past surgical history on file.         Family History:   No family history on file.         Social History:     Social History     Tobacco Use     Smoking status: Never Smoker     Smokeless tobacco: Never Used   Substance Use Topics     Alcohol use: No     Frequency: Never     History   Sexual Activity     Sexual activity: Not Currently            Current Medications (antimicrobials listed in bold):            Allergies:   No Known Allergies         Physical Exam:   Vitals were reviewed  Patient Vitals for the past 24 hrs:   BP Pulse SpO2   06/05/19 1524 130/76 62 99 %     Ranges for his vital signs:  Pulse:  [62] 62  BP: (130)/(76) 130/76  SpO2:   [99 %] 99 %    Physical Examination:  GENERAL:  well-developed, well-nourished, in bed in no acute distress.   HEENT:  Head is normocephalic, atraumatic   EYES:  Eyes have anicteric sclerae without conjunctival injection or stigmata of endocarditis.    ENT:  Oropharynx is moist without exudates or ulcers. Tongue is midline  NECK:  Supple. No cervical lymphadenopathy. No carotid bruits.  LUNGS:  Clear to auscultation bilateral.   CARDIOVASCULAR:  Regular rate and rhythm with no murmurs, gallops or rubs.  ABDOMEN:  Normal bowel sounds, soft, nontender. No appreciable pulsatile masses.  SKIN:  No acute rashes.    NEUROLOGIC:  Grossly nonfocal. Active x 4 extremities  PULSES: Radial, carotid, femoral, pedal pulses 2+ bilaterally. Left radial pulse absent with elevation to 180 degrees. +EAST Maneuver bilaterally. Right arm slightly larger than right. No superficial neck or chest wall veins appreciated.         Laboratory Data:     Hematology Studies    Recent Labs   Lab Test 02/16/19  1704 11/26/18  2237 04/26/18  1250 02/12/18  1640   WBC 6.3 8.3 5.8 6.5   ANEU 3.0 5.7 3.7 4.1   AEOS 0.3 0.2 0.1 0.2   HGB 15.8 15.3 15.3 16.5   MCV 87 85 83 86    239 224 214       Metabolic Studies     Recent Labs   Lab Test 02/16/19  1704 11/26/18  2237 04/26/18  1250 02/12/18  1640    141 142 140   POTASSIUM 3.7 3.7 3.7 4.0   CHLORIDE 102 106 105 106   CO2 30 27 28 28   BUN 11 15 9 12   CR 0.94 0.99 1.05 0.86   GFRESTIMATED >90 90 84 >90       Imaging Studies  CXR without cervical rib or bony abnormality of first rib. No venous evaluation of LUE. Narrowed, scarred right medial subclavian vein c/w prior dvt and first rib compression. Arterial PPGs positive on the left.    Total time spent 60  minutes face to face with patient with more than 50% time spent in counseling and coordination of care.

## 2019-06-05 NOTE — LETTER
6/5/2019       RE: Johny Singh  [address]  [facility] [address]     Dear Colleague,    Thank you for referring your patient, Johny Singh, to the [facility] at [facility]. Please see a copy of my visit note below.      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 We spent a significant amount of time discussing what effort thrombosis and venous thoracic outlet syndrome on the right and arterial thoracic outlet means on the left - via his . Unfortunately his venous thoracic outlet / effort thrombosis was just recently identified though his right subclavian DVT occurred last February 2018. We discussed the ongoing scarring that occurs of the vein as long as the extrinsic compression of the first rib and anterior scalene are in place. This can result in significant disability for him over time, particularly given his young age and his righthand dominance. 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 His left arm / hand are starting to have tingling / issues in his left shoulder with abduction as well. He will think about things and contact us if he would like to proceed with left transaxillary first rib resection.    Many thanks for involving me in the care of this very pleasant patient. Should any questions or concerns arise, please don't hesitate to contact me.    Warm Regards,    Trudy Morris MD, DFSVS, RPVI  Director, [place] [facility]  Professor and Chief, Vascular and Endovascular Surgery  [facility]  Cell: [phone]  [email or web address]           History of Present  Illness:   Vera Singh is a 28 y/o righthanded male who was found to have RUE swelling and DVT in February 2018 - preceded by several weeks of right arm/hand tingling, intermittent pain and occasional swelling. He has remained on anticoagulation since that time but has continued to have issues with swelling and arm/shoulder/right neck pain with 45-90 degree abduction. He began working out at [employer] with bicycling and biceps curls, hoping to improve his right arm, but this has not helped. Additionally he is starting to have some symptoms in his left shoulder and left hand when abducted. Is still on anticoagulation. 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 Does not do any unloading or stacking for work.          Review of Systems:   CONSTITUTIONAL:  No fevers or chills  EYES: negative for icterus  ENT:  negative for hearing loss, tinnitus and sore throat  RESPIRATORY:  negative for cough with sputum and dyspnea  CARDIOVASCULAR:  negative for chest pain, dyspnea  GASTROINTESTINAL:  negative for nausea, vomiting, diarrhea and constipation  GENITOURINARY:  negative for dysuria  HEME:  No easy bruising  INTEGUMENT:  negative for rash and pruritus  NEURO:  Negative for headache         Past Medical History:     Past Medical History:   Diagnosis Date     Acute deep vein thrombosis (DVT) of axillary vein of right upper extremity (H)      Arm vein blood clot 03/2018    Undocumented but stated 4 weeks xarelto             Past Surgical History:   No past surgical history on file.         Family History:   No family history on file.         Social History:     Social History     Tobacco Use     Smoking status: Never Smoker     Smokeless tobacco: Never Used   Substance Use Topics     Alcohol use: No     Frequency: Never     History   Sexual Activity     Sexual activity: Not Currently            Current Medications  (antimicrobials listed in bold):            Allergies:   No Known Allergies         Physical Exam:   Vitals were reviewed  Patient Vitals for the past 24 hrs:   BP Pulse SpO2   06/05/19 1524 130/76 62 99 %     Ranges for his vital signs:  Pulse:  [62] 62  BP: (130)/(76) 130/76  SpO2:  [99 %] 99 %    Physical Examination:  GENERAL:  well-developed, well-nourished, in bed in no acute distress.   HEENT:  Head is normocephalic, atraumatic   EYES:  Eyes have anicteric sclerae without conjunctival injection or stigmata of endocarditis.    ENT:  Oropharynx is moist without exudates or ulcers. Tongue is midline  NECK:  Supple. No cervical lymphadenopathy. No carotid bruits.  LUNGS:  Clear to auscultation bilateral.   CARDIOVASCULAR:  Regular rate and rhythm with no murmurs, gallops or rubs.  ABDOMEN:  Normal bowel sounds, soft, nontender. No appreciable pulsatile masses.  SKIN:  No acute rashes.    NEUROLOGIC:  Grossly nonfocal. Active x 4 extremities  PULSES: Radial, carotid, femoral, pedal pulses 2+ bilaterally. Left radial pulse absent with elevation to 180 degrees. +EAST Maneuver bilaterally. Right arm slightly larger than right. No superficial neck or chest wall veins appreciated.         Laboratory Data:     Hematology Studies    Recent Labs   Lab Test 02/16/19  1704 11/26/18 2237 04/26/18  1250 02/12/18  1640   WBC 6.3 8.3 5.8 6.5   ANEU 3.0 5.7 3.7 4.1   AEOS 0.3 0.2 0.1 0.2   HGB 15.8 15.3 15.3 16.5   MCV 87 85 83 86    239 224 214       Metabolic Studies     Recent Labs   Lab Test 02/16/19  1704 11/26/18  2237 04/26/18  1250 02/12/18  1640    141 142 140   POTASSIUM 3.7 3.7 3.7 4.0   CHLORIDE 102 106 105 106   CO2 30 27 28 28   BUN 11 15 9 12   CR 0.94 0.99 1.05 0.86   GFRESTIMATED >90 90 84 >90       Imaging Studies  CXR without cervical rib or bony abnormality of first rib. No venous evaluation of LUE. Narrowed, scarred right medial subclavian vein c/w prior dvt and first rib compression. Arterial  PPGs positive on the left.    Total time spent 60  minutes face to face with patient with more than 50% time spent in counseling and coordination of care.    Trudy Morris MD

## 2019-06-05 NOTE — NURSING NOTE
Vascular Rooming Note     Said Francisco's goals for this visit include:   Chief Complaint   Patient presents with     Consult     Said, is being  seen today for a consult regarding TOS  (that started in 2/18) right side of chest through arm to elbow tingles, and can not hold a phone right hand while flexing, or holding steering wheel or carrying anything with the right arm for a long time, last week symptoms started on the left side, as reported by patient.     Celena Henderson LPN

## 2019-06-05 NOTE — LETTER
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 We spent a significant amount of time discussing what effort thrombosis and venous thoracic outlet syndrome on the right and arterial thoracic outlet means on the left - via his . Unfortunately his venous thoracic outlet / effort thrombosis was just recently identified though his right subclavian DVT occurred last February 2018. We discussed the ongoing scarring that occurs of the vein as long as the extrinsic compression of the first rib and anterior scalene are in place. This can result in significant disability for him over time, particularly given his young age and his righthand dominance. 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 His left arm / hand are starting to have tingling / issues in his left shoulder with abduction as well. He will think about things and contact us if he would like to proceed with left transaxillary first rib resection.    Many thanks for involving me in the care of this very pleasant patient. Should any questions or concerns arise, please don't hesitate to contact me.    Warm Regards,    Trudy Morris MD, DFSVS, RPVI  Director, [place] [facility]  Professor and Chief, Vascular and Endovascular Surgery  [facility]  Cell: [phone]  [email or web address]           History of Present Illness:   Vera Singh is a 30 y/o righthanded male who was found to have RUE swelling and DVT in February 2018 - preceded by several weeks of right arm/hand tingling, intermittent pain and occasional  swelling. He has remained on anticoagulation since that time but has continued to have issues with swelling and arm/shoulder/right neck pain with 45-90 degree abduction. He began working out at [employer] with bicycling and biceps curls, hoping to improve his right arm, but this has not helped. Additionally he is starting to have some symptoms in his left shoulder and left hand when abducted. Is still on anticoagulation. 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 Does not do any unloading or stacking for work.          Review of Systems:   CONSTITUTIONAL:  No fevers or chills  EYES: negative for icterus  ENT:  negative for hearing loss, tinnitus and sore throat  RESPIRATORY:  negative for cough with sputum and dyspnea  CARDIOVASCULAR:  negative for chest pain, dyspnea  GASTROINTESTINAL:  negative for nausea, vomiting, diarrhea and constipation  GENITOURINARY:  negative for dysuria  HEME:  No easy bruising  INTEGUMENT:  negative for rash and pruritus  NEURO:  Negative for headache           Past Medical History:     Past Medical History:   Diagnosis Date     Acute deep vein thrombosis (DVT) of axillary vein of right upper extremity (H)      Arm vein blood clot 03/2018    Undocumented but stated 4 weeks xarelto             Past Surgical History:   No past surgical history on file.         Family History:   No family history on file.         Social History:     Social History     Tobacco Use     Smoking status: Never Smoker     Smokeless tobacco: Never Used   Substance Use Topics     Alcohol use: No     Frequency: Never     History   Sexual Activity     Sexual activity: Not Currently            Current Medications (antimicrobials listed in bold):            Allergies:   No Known Allergies         Physical Exam:   Vitals were reviewed  Patient Vitals for the past 24 hrs:   BP Pulse SpO2   06/05/19 1524  130/76 62 99 %     Ranges for his vital signs:  Pulse:  [62] 62  BP: (130)/(76) 130/76  SpO2:  [99 %] 99 %    Physical Examination:  GENERAL:  well-developed, well-nourished, in bed in no acute distress.   HEENT:  Head is normocephalic, atraumatic   EYES:  Eyes have anicteric sclerae without conjunctival injection or stigmata of endocarditis.    ENT:  Oropharynx is moist without exudates or ulcers. Tongue is midline  NECK:  Supple. No cervical lymphadenopathy. No carotid bruits.  LUNGS:  Clear to auscultation bilateral.   CARDIOVASCULAR:  Regular rate and rhythm with no murmurs, gallops or rubs.  ABDOMEN:  Normal bowel sounds, soft, nontender. No appreciable pulsatile masses.  SKIN:  No acute rashes.    NEUROLOGIC:  Grossly nonfocal. Active x 4 extremities  PULSES: Radial, carotid, femoral, pedal pulses 2+ bilaterally. Left radial pulse absent with elevation to 180 degrees. +EAST Maneuver bilaterally. Right arm slightly larger than right. No superficial neck or chest wall veins appreciated.         Laboratory Data:     Hematology Studies    Recent Labs   Lab Test 02/16/19  1704 11/26/18  2237 04/26/18  1250 02/12/18  1640   WBC 6.3 8.3 5.8 6.5   ANEU 3.0 5.7 3.7 4.1   AEOS 0.3 0.2 0.1 0.2   HGB 15.8 15.3 15.3 16.5   MCV 87 85 83 86    239 224 214       Metabolic Studies     Recent Labs   Lab Test 02/16/19  1704 11/26/18  2237 04/26/18  1250 02/12/18  1640    141 142 140   POTASSIUM 3.7 3.7 3.7 4.0   CHLORIDE 102 106 105 106   CO2 30 27 28 28   BUN 11 15 9 12   CR 0.94 0.99 1.05 0.86   GFRESTIMATED >90 90 84 >90       Imaging Studies  CXR without cervical rib or bony abnormality of first rib. No venous evaluation of LUE. Narrowed, scarred right medial subclavian vein c/w prior dvt and first rib compression. Arterial PPGs positive on the left.    Total time spent 60  minutes face to face with patient with more than 50% time spent in counseling and coordination of care.    Trudy Morris MD

## 2020-03-11 ENCOUNTER — HEALTH MAINTENANCE LETTER (OUTPATIENT)
Age: 30
End: 2020-03-11

## 2021-01-03 ENCOUNTER — HEALTH MAINTENANCE LETTER (OUTPATIENT)
Age: 31
End: 2021-01-03

## 2021-04-25 ENCOUNTER — HEALTH MAINTENANCE LETTER (OUTPATIENT)
Age: 31
End: 2021-04-25

## 2021-10-10 ENCOUNTER — HEALTH MAINTENANCE LETTER (OUTPATIENT)
Age: 31
End: 2021-10-10

## 2022-05-21 ENCOUNTER — HEALTH MAINTENANCE LETTER (OUTPATIENT)
Age: 32
End: 2022-05-21

## 2022-09-18 ENCOUNTER — HEALTH MAINTENANCE LETTER (OUTPATIENT)
Age: 32
End: 2022-09-18

## 2023-06-04 ENCOUNTER — HEALTH MAINTENANCE LETTER (OUTPATIENT)
Age: 33
End: 2023-06-04